# Patient Record
Sex: MALE | Race: WHITE | NOT HISPANIC OR LATINO | Employment: UNEMPLOYED | URBAN - METROPOLITAN AREA
[De-identification: names, ages, dates, MRNs, and addresses within clinical notes are randomized per-mention and may not be internally consistent; named-entity substitution may affect disease eponyms.]

---
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
## 2024-01-16 ENCOUNTER — TELEPHONE (OUTPATIENT)
Dept: PSYCHIATRY | Facility: CLINIC | Age: 19
End: 2024-01-16

## 2024-01-16 NOTE — TELEPHONE ENCOUNTER
Patient contacted the office to schedule a follow up visit with provider. Patient is now scheduled for 1/18  at 8:30 am virtually.

## 2024-01-18 ENCOUNTER — TELEMEDICINE (OUTPATIENT)
Dept: PSYCHIATRY | Facility: CLINIC | Age: 19
End: 2024-01-18
Payer: COMMERCIAL

## 2024-01-18 DIAGNOSIS — F90.0 ADHD (ATTENTION DEFICIT HYPERACTIVITY DISORDER), INATTENTIVE TYPE: ICD-10-CM

## 2024-01-18 DIAGNOSIS — F32.0 CURRENT MILD EPISODE OF MAJOR DEPRESSIVE DISORDER WITHOUT PRIOR EPISODE (HCC): ICD-10-CM

## 2024-01-18 DIAGNOSIS — F41.1 GENERALIZED ANXIETY DISORDER: Primary | ICD-10-CM

## 2024-01-18 PROCEDURE — 99214 OFFICE O/P EST MOD 30 MIN: CPT | Performed by: PSYCHIATRY & NEUROLOGY

## 2024-01-18 PROCEDURE — 90833 PSYTX W PT W E/M 30 MIN: CPT | Performed by: PSYCHIATRY & NEUROLOGY

## 2024-01-18 RX ORDER — METHYLPHENIDATE HYDROCHLORIDE 30 MG/1
30 CAPSULE, EXTENDED RELEASE ORAL EVERY MORNING
Qty: 30 CAPSULE | Refills: 0 | Status: SHIPPED | OUTPATIENT
Start: 2024-02-13

## 2024-01-18 NOTE — PSYCH
Virtual Regular Visit    Verification of patient location:    Patient is located at Home in the following state in which I hold an active license [place]      Assessment/Plan:    Problem List Items Addressed This Visit          Other    Generalized anxiety disorder - Primary    Relevant Medications    methylphenidate (Ritalin LA) 30 MG 24 hr capsule (Start on 2/13/2024)    Current mild episode of major depressive disorder without prior episode (HCC)    Relevant Medications    methylphenidate (Ritalin LA) 30 MG 24 hr capsule (Start on 2/13/2024)    ADHD (attention deficit hyperactivity disorder), inattentive type    Relevant Medications    methylphenidate (Ritalin LA) 30 MG 24 hr capsule (Start on 2/13/2024)       Goals addressed in session: Goal 1 and Goal 2          Reason for visit is   Chief Complaint   Patient presents with    Virtual Regular Visit    ADHD    Anxiety    Follow-up    Medication Management        Encounter provider Devante Burgos MD    Provider located at [address] [place] [id number] [phone]      Recent Visits  Date Type Provider Dept   01/16/24 Telephone Devante Burgos MD [facility]   Showing recent visits within past 7 days and meeting all other requirements  Today's Visits  Date Type Provider Dept   01/18/24 Telemedicine Devante Burgos MD [facility]   Showing today's visits and meeting all other requirements  Future Appointments  No visits were found meeting these conditions.  Showing future appointments within next 150 days and meeting all other requirements       The patient was identified by name and date of birth. Demario Messina was informed that this is a telemedicine visit and that the visit is being conducted throughthe Epic Embedded platform. He agrees to proceed..  My office door was closed. No one else was in the room.  He acknowledged consent and understanding of privacy  and security of the video platform. The patient has agreed to participate and understands they can discontinue the visit at any time.    Patient is aware this is a billable service.        MEDICATION MANAGEMENT NOTE        [facility]      Name and Date of Birth:  Demario Messina 18 y.o. 2005 MRN: [id number]    Date of Visit: January 18, 2024    SUBJECTIVE:    Reason for Visit:   Chief Complaint   Patient presents with    Virtual Regular Visit    ADHD    Anxiety    Follow-up    Medication Management     Chief complaint:    Demario is seen today for a follow-up for panic attack, anxiety symptoms and medication management.    Today, Demario reports that he tolerated the Ritalin well and felt it was helpful with his attention and focus.  However he has not taken it on a daily basis.  He never started college here.  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  He has been accepted and will be starting in the [employer] at [place].  He will be going there in February and starting on end of March 2024.  He terms his overall mood has been happy except being little anxious about the move.  He rates his anxiety symptoms as 5/10 in the worst case scenario and depression as 1/10, 10 being the worst.  He continues to apply for his coping skill and feels that it is manageable.  He has been following up with his therapist at [facility] regularly but will be discharged soon because he will be going to [place].  He feels the Ritalin 30 mg daily was more helpful than the Strattera and would like to continue the same.  He also understand that as he will be moving out of state he will not be able to continue to follow-up through virtual visit and agreeable with discharge at this time.  He denies any symptoms of OCD, or panic attack at this time.  He denies any symptoms of ranjeet, hypomania.  Denies any perceptual disturbances.  No delusions  "elicited at this time.  He reports sleeping adequate hours.  Denies any suicidal/homicidal ideation intent or plan.  He did not have any other complaint of concern.      HPI ROS Appetite Changes and Sleep:     He reports adequate number of sleep hours, adequate appetite, adequate energy level    Review Of Systems:    Constitutional as noted in HPI   ENT negative   Cardiovascular negative   Respiratory negative   Gastrointestinal negative   Genitourinary negative   Musculoskeletal negative   Integumentary negative   Neurological negative   Endocrine negative   Other Symptoms none, all other systems are negative     The italicized information immediately following this statement has been pulled forward from previous documentation written by this provider, during initial office visit on 02/20/20 and any pertinent changes have been updated accordingly:     Anxiety- patient reports he has been anxious \"all my life\" though in the past year he feels it has become more prominent.  He is always worried about everything around him, \"even things I should not be worried about\".  He reports he has significant social social anxiety when it comes to \"group of new people\".  He just completely shuts down until they start conversing with him.  He also reports always worried about something bad might happen.  His always worried about his grades and worries about the future and feels sometimes it is more than usual.  He always compared himself is with other peers and wonders if he is as good as the other kids.  He worries about things which has already happened.  He also worries about his performances and and if he is doing it right even before the task is complete.  He reports that he always had it and since middle school it started to become more.  He is aware that sometimes it is excessive but he cannot help himself.  Today he rates his anxiety as 7/10 in severity, 10 being severely anxious.     Panic attack- panic patient reports " "getting panic attack since 6 grade.  Terms his panic attack as \"heart beating faster, stomach cramps, difficulty with breathing, fever nervous, shaky and fear of something bad might happen\".  He reports getting them almost on daily basis mostly in the school periods though it sometimes get happened outside of school too.  It usually lasts from 5-20 minutes.  He reports the common triggers her \"meeting new person, surprise, not being in control, sudden changes in plan or situation\".  He reports sometimes getting them without any specific triggers.  He reports most of the panic attacks have happened in the school.  He usually tries to distract himself and takes deep breathing.  He also reports having anxiety about anticipatory attack.  He feels his panic attack has worsened in the past few months and increased in frequency.     Attention deficit-patient reports he has been struggling with paying attention to his class since starting 10th grade and in the past 2-3 months it has worsened.  He reports he reports his grades have fallen as he is not able to pay attention and is spacing out.  He reports on several classes he will suddenly realize that the class was over or he has gone to an next class as though he \"zoned out\".  He reports he did struggle in the past with some attention deficit but pushed himself through it never was diagnosed with attention deficit.  He reports struggling most with the math and scores is 30's.  He reports poor grade in for subjects.  His grade ranges from 30s to 80s.  Patient also reports difficulty with following direction or finish activities.  He often makes careless mistakes and give poor attention to details which has impacted his homework. He gets very easily distracted.  He is forgetful about his daily activities and will often loses things.  As per mother, patient was never evaluated for ADHD as school has never had any concern but she is not sure if it was missed.  She reports " "having her concern about it in the past even in last year but as patient \"pulled through\" she did not seek help.  However in the last few months patient's grades have declined significantly and therefore she wanted him to be evaluated.     Depression- patient reports feeling sad at times.  He reports losing interest in other activities except Karate.  In the past year he recalls being sad 100/365 days.  However he denies having sad mood or loss of interest for consecutive 2 weeks.  He also reports fluctuating sleeping pattern and sometimes waking up not feeling fresh.  He sleeps between 5-6 hours during weekdays and almost 12 hours during the weekends.  He rates his depression as 5/10 in severity today.  Complaints of lower energy level in the past few months. He denies having any passive death wishes.  Denies any active suicidal/homicidal ideation intent or plan at this time.  He denies symptoms suggestive of ranjeet, hypomania and psychosis at this time.     Mother corroborates with the above-mentioned history.  She reported that she was concerned about patient's poor school performance and was wondering if patient is struggling with attention deficit or anxiety symptoms.  Both patient and mother is amenable at this time with starting medication.  Mother did not have any other safety concerns at this time.     Past Psychiatric History:      Past Inpatient Psychiatric Treatment:   No history of past inpatient psychiatric admissions  Past Outpatient Psychiatric Treatment:    No history of past outpatient psychiatric treatment  Past Suicide Attempts: no  Past Violent Behavior: no  Past Psychiatric Medication Trials: none  Current medications:   for IBS-Elavil 10 mg 3 times daily at bedtime, hyoscyamine, Pepcid 20 mg daily.     Traumatic History:      Abuse: no history of physical or sexual abuse  Other Traumatic Events: none      Family Psychiatric History:   No family history of psychiatric illness, suicide attempt, " "substance abuse.     Substance Use History:  Denies any illicit substance use.  Tatyana any over-the-counter drug use.     Past Medical History:  Lactose intolerance.  Irritable bowel syndrome with diarrhea- currently on Elavil 10 mg 3 tabs at bedtime and follows up with GI.  History of head injury, seizure-none     Allergies:  No Known Allergies     Birth and Developmental History:  Birth wt.- 6.13 lbs, FT  at 40th week.    Spoke first word: at 6th month  Walked: at 9th month  Toilet trained:3 yr  Early intervention: none     Social History:    Patient lives with parents in [place].  Mother is A NICU RN at [employer], father is a .  He attended [employer] from 1st-5th grade, [employer] for 6th,7th and 8th grade. Patient is currently enrolled in 10th grade at [employer], in standard education.  Has falling grades in current academic year.  As per patient he was able to mental did good grades until last year.   He is heterosexual.  Has been in relationship in the past.  Has few close friends.    Access to guns- denies.    History Review: The following portions of the patient's history were reviewed and updated as appropriate: allergies, current medications, past family history, past medical history, past social history, past surgical history and problem list.       OBJECTIVE:     Vital signs in last 24 hours:    There were no vitals filed for this visit.    Video exam-    Mental Status Evaluation:  Appearance sitting comfortably at home, dressed in casual clothing, good eye contact, long hair   Mood \" good\"   Affect Appears generally euthymic, stable, mood-congruent   Speech Normal rate, rhythm, and volume   Thought Processes Linear and goal directed   Associations intact associations   Perceptual disturbances Denies any auditory or visual hallucinations   Abnormal Thoughts  Risk Potential Suicidal ideation - None  Homicidal ideation - " None  Potential for aggression - No   Thought Content No passive or active suicidal or homicidal ideation, intent, or plan.   Orientation Oriented to person, place, time, and situation   Recent and Remote Memory Grossly intact   Attention Span and Concentration Concentration intact   Intellect Appears to be of Average Intelligence   Insight Insight intact   Judgement judgment was intact   Language Within normal limits   Fund of Knowledge Age appropriate   Pain None     Laboratory Results:   Recent Labs (last 6 months):   No visits with results within 6 Month(s) from this visit.   Latest known visit with results is:   Appointment on 04/26/2023   Component Date Value    WBC 04/26/2023 5.51     RBC 04/26/2023 5.36     Hemoglobin 04/26/2023 15.9     Hematocrit 04/26/2023 46.1     MCV 04/26/2023 86     MCH 04/26/2023 29.7     MCHC 04/26/2023 34.5     RDW 04/26/2023 12.3     MPV 04/26/2023 9.8     Platelets 04/26/2023 277     nRBC 04/26/2023 0     Neutrophils Relative 04/26/2023 51     Immat GRANS % 04/26/2023 0     Lymphocytes Relative 04/26/2023 37     Monocytes Relative 04/26/2023 9     Eosinophils Relative 04/26/2023 2     Basophils Relative 04/26/2023 1     Neutrophils Absolute 04/26/2023 2.82     Immature Grans Absolute 04/26/2023 0.00     Lymphocytes Absolute 04/26/2023 2.03     Monocytes Absolute 04/26/2023 0.48     Eosinophils Absolute 04/26/2023 0.12     Basophils Absolute 04/26/2023 0.06     Sodium 04/26/2023 135     Potassium 04/26/2023 4.4     Chloride 04/26/2023 108     CO2 04/26/2023 22     ANION GAP 04/26/2023 5     BUN 04/26/2023 17     Creatinine 04/26/2023 0.93     Glucose, Fasting 04/26/2023 70     Calcium 04/26/2023 9.2     AST 04/26/2023 17     ALT 04/26/2023 17     Alkaline Phosphatase 04/26/2023 66     Total Protein 04/26/2023 7.6     Albumin 04/26/2023 4.5     Total Bilirubin 04/26/2023 1.37 (H)     eGFR 04/26/2023 118     Cholesterol 04/26/2023 149     Triglycerides 04/26/2023 57     HDL, Direct  04/26/2023 49     LDL Calculated 04/26/2023 89     Non-HDL-Chol (CHOL-HDL) 04/26/2023 100     TSH 3RD GENERATON 04/26/2023 1.330     Hepatitis C Ab 04/26/2023 Non-reactive     HIV-1 p24 Antigen 04/26/2023 Non-Reactive     HIV-1 Antibody 04/26/2023 Non-Reactive     HIV-2 Antibody 04/26/2023 Non-Reactive     HIV Ag-Ab 5th Gen 04/26/2023 Non-Reactive      I have personally reviewed all pertinent laboratory/tests results.      Assessment/Plan:       Diagnoses and all orders for this visit:    Generalized anxiety disorder  -     methylphenidate (Ritalin LA) 30 MG 24 hr capsule; Take 1 capsule (30 mg total) by mouth every morning Max Daily Amount: 30 mg Do not start before February 13, 2024.    Current mild episode of major depressive disorder without prior episode (HCC)  -     methylphenidate (Ritalin LA) 30 MG 24 hr capsule; Take 1 capsule (30 mg total) by mouth every morning Max Daily Amount: 30 mg Do not start before February 13, 2024.    ADHD (attention deficit hyperactivity disorder), inattentive type  -     methylphenidate (Ritalin LA) 30 MG 24 hr capsule; Take 1 capsule (30 mg total) by mouth every morning Max Daily Amount: 30 mg Do not start before February 13, 2024.          Assessment:  Demario is a 18 y.o. male, domiciled with parents in [place], started college at [employer], graduated from [employer] (standard type of education, 30's -80's grades, 6 close friends, no h/o bullying or teasing), no prior established psychiatric history, no prior psychiatric hospitalization , no prior suicidal attempts , no prior history of self-injurious behavior, no prior history of physical aggression, no substance abuse history, PMH significant for lactose intolerance and irritable bowel syndrome currently follows up with GI,  presents to [facility] outpatient clinic for psychiatric evaluation to address ongoing symptoms of anxiety, depression poor concentration in school.  On assessment today, Demario is  progressing.  He has come for follow-up after 5 months.  He has tolerated the Ritalin well and felt it was helpful with his attention and focus.  He did not take it on regular basis because he did not start college here at [place].  He will be starting on March 25 at the [employer] in [place] which is a [employer] to pursue [employer].  Terms his overall mood has been euthymic.  Anxiety symptoms are situational but mostly manageable with coping skills which he learned from his therapist.  He continues to follow-up with his therapist at [facility].  Rates his anxiety and depression symptoms minimal today..  He denies any suicidal/homicidal ideation intent or plan.  Patient understands that as he will be moving to [place] he will not be able to continue care and he is comfortable to be discharged at this point of time.  From GI he was prescribed hyoscyamine for his irritable bowel syndrome and continues to take the same apart from Ritalin LA 30 mg daily.  He denies any symptoms of ranjeet, hypomania or psychosis.  At this time recommended to continue with Ritalin LA 30 mg daily and find a new provider.  He verbalized understanding.  He is psychiatrically stable at this time to continue outpatient care.  He will be discharged from the clinic at this time.   .      DSM Diagnoses:  Panic attack  Unspecified depressive disorder  ADHD inattentive type  R/o learning difficulty with math      IBS  Allergies:: NKDA                        Recommendation/plan:  1.Currently, patient is not an imminent risk of harm to self or others and is appropriate for outpatient level of care at this time  2. Medications:   A) for anxiety and panic attack-no meds at this time as patient has discontinued   B) for attention deficit-discontinue Strattera 40 mg daily.  Start Ritalin LA 30 mg daily.  3. Patient and family were educated to seek emergency care if patient decompensates in any way including becoming suicidal. Patient and  family verbalized understanding.  4. Continue therapy at [facility]  5. Medical- F/u with primary care provider for on-going medical care.  Patient has been taking hyoscyamine for IBS and following up with GI accordingly.  6.  No follow-up appointment given as patient will be discharged.      Treatment Recommendations:      Risks, Benefits And Possible Side Effects Of Medications:  Risks, benefits, and possible side effects of medications explained to patient and family, they verbalize understanding    Controlled Medication Discussion: No records found for controlled prescriptions according to [place] Prescription Drug Monitoring Program.      Treatment Plan: [place] [facility]    This note has been constructed using a voice recognition system.    There may be translation, syntax,  or grammatical errors. If you have any questions, please contact the dictating provider.        Visit Time    Visit Start Time: 8:30 AM  Visit Stop Time: 9:00 AM  Total Visit Duration: 30 minutes

## 2024-01-24 ENCOUNTER — TELEMEDICINE (OUTPATIENT)
Dept: BEHAVIORAL/MENTAL HEALTH CLINIC | Facility: CLINIC | Age: 19
End: 2024-01-24
Payer: COMMERCIAL

## 2024-01-24 DIAGNOSIS — F90.0 ADHD (ATTENTION DEFICIT HYPERACTIVITY DISORDER), INATTENTIVE TYPE: ICD-10-CM

## 2024-01-24 DIAGNOSIS — F32.0 CURRENT MILD EPISODE OF MAJOR DEPRESSIVE DISORDER WITHOUT PRIOR EPISODE (HCC): ICD-10-CM

## 2024-01-24 DIAGNOSIS — F41.1 GENERALIZED ANXIETY DISORDER: Primary | ICD-10-CM

## 2024-01-24 PROCEDURE — 90832 PSYTX W PT 30 MINUTES: CPT | Performed by: PSYCHIATRY & NEUROLOGY

## 2024-01-24 NOTE — PSYCH
Virtual Regular Visit    Verification of patient location:    Patient is located at Home in the following state in which I hold an active license [place]      Assessment/Plan:    Problem List Items Addressed This Visit          Other    Generalized anxiety disorder - Primary    Current mild episode of major depressive disorder without prior episode (HCC)    ADHD (attention deficit hyperactivity disorder), inattentive type        Reason for visit is   Chief Complaint   Patient presents with    Virtual Regular Visit       Encounter provider REED LÓPEZ    Provider located at [facility]  [facility]  [address] [id number] [phone]      Recent Visits  No visits were found meeting these conditions.  Showing recent visits within past 7 days and meeting all other requirements  Today's Visits  Date Type Provider Dept   01/24/24 Telemedicine REED López  [facility]   Showing today's visits and meeting all other requirements  Future Appointments  No visits were found meeting these conditions.  Showing future appointments within next 150 days and meeting all other requirements       The patient was identified by name and date of birth. Demario Messina was informed that this is a telemedicine visit and that the visit is being conducted throughthe Epic Embedded platform. He agrees to proceed..  My office door was closed. No one else was in the room.  He acknowledged consent and understanding of privacy and security of the video platform. The patient has agreed to participate and understands they can discontinue the visit at any time.    Patient is aware this is a billable service.   HPI     Past Medical History:   Diagnosis Date    Anxiety     Depression     Intermittent diarrhea     Lactose intolerance     Panic attack        Past Surgical History:   Procedure Laterality Date    APPENDECTOMY       CIRCUMCISION         Current Outpatient Medications   Medication Sig Dispense Refill    amitriptyline (ELAVIL) 10 mg tablet Take 3 tablets (30 mg total) by mouth daily after dinner (Patient not taking: Reported on 4/17/2023) 270 tablet 1    famotidine (PEPCID) 20 mg tablet Take 1 tablet (20 mg total) by mouth 2 (two) times a day as needed for heartburn 180 tablet 1    gentamicin (GARAMYCIN) 0.3 % ophthalmic solution Administer 2 drops to the right eye 4 (four) times a day 5 mL 0    hyoscyamine (LEVSIN/SL) 0.125 mg SL tablet Take 2 tablets (0.25 mg total) by mouth every 6 (six) hours as needed for cramping 90 tablet 0    Lactase 4500 units CHEW Chew every 8 (eight) hours      methylphenidate (RITALIN LA) 30 MG 24 hr capsule Take 1 capsule (30 mg total) by mouth every morning Max Daily Amount: 30 mg 30 capsule 0    methylphenidate (RITALIN LA) 30 MG 24 hr capsule Take 1 capsule (30 mg total) by mouth every morning Max Daily Amount: 30 mg Do not start before September 1, 2023. 30 capsule 0    [START ON 2/13/2024] methylphenidate (Ritalin LA) 30 MG 24 hr capsule Take 1 capsule (30 mg total) by mouth every morning Max Daily Amount: 30 mg Do not start before February 13, 2024. 30 capsule 0     No current facility-administered medications for this visit.        No Known Allergies    Review of Systems    Video Exam    There were no vitals filed for this visit.    Physical Exam   Behavioral Health Psychotherapy Progress Note    Psychotherapy Provided: Individual Psychotherapy     1. Generalized anxiety disorder        2. Current mild episode of major depressive disorder without prior episode ([facility])        3. ADHD (attention deficit hyperactivity disorder), inattentive type            Goals addressed in session: Goal 1     DATA: Met with Demario for follow up. Demario shared that he is excited but anxious to leave for film school in [place] at the end of next month, and talked about some of the things he is worried about (taking  "care of himself on his own for the first time, staying organized, not forgetting things).   He also expressed worry about not getting his medications refilled regularly and what will happen to his ability to concentrate at school if he runs out of medication.  Used solution-focused therapy to help Demario identify ways that he could solve his prescription issue, and encouraged him to look into various options to help this process along.  Demario noted that he has not been taking his meds regularly to \"save\" them for when he is in school, as he is concerned about not getting them.  Demario also said that he thinks his ADHD has been worse recently, as he will have difficulty focusing on a conversation and understanding what people are saying to him, and immediately forgetting conversations.  Discussed nature of ADHD, ways that he might try to offset those symptoms and cope with the distress they cause.  Also discussed discharge from therapy after next appointment at end of February, due to his being out of state for a year, at least.  Demario said that he understood the reason for discharge, and this writer emphasized that he can return to therapy if he wants to once he returns to [place].    During this session, this clinician used the following therapeutic modalities: Client-centered Therapy, Cognitive Behavioral Therapy, Solution-Focused Therapy, and Supportive Psychotherapy    Substance Abuse was not addressed during this session. If the client is diagnosed with a co-occurring substance use disorder, please indicate any changes in the frequency or amount of use: n/a. Stage of change for addressing substance use diagnoses: No substance use/Not applicable    ASSESSMENT:  Demario Messina presents with a Euthymic/ normal and Anxious mood.     his affect is Normal range and intensity, which is congruent, with his mood and the content of the session. The client has made progress on their goals.     Demario Messina presents with a low risk " "of suicide, low risk of self-harm, and minimal risk of harm to others.    For any risk assessment that surpasses a \"low\" rating, a safety plan must be developed.    A safety plan was indicated: no  If yes, describe in detail n/a    PLAN: Between sessions, Demario Messina will investigate ways to have prescriptions filled in [place] while at school, and will use anxiety reduction strategies as needed while preparing to leave for school. At the next session, the therapist will use Client-centered Therapy, Cognitive Behavioral Therapy, Solution-Focused Therapy, and Supportive Psychotherapy to address anxiety, ADHD.    Behavioral Health Treatment Plan and Discharge Planning: Demario Messina is aware of and agrees to continue to work on their treatment plan. They have identified and are working toward their discharge goals. yes    Visit start and stop times:    01/24/24  Start Time: 1605  Stop Time: 1639  Total Visit Time: 34 minutes    "

## 2024-01-26 ENCOUNTER — OFFICE VISIT (OUTPATIENT)
Dept: FAMILY MEDICINE CLINIC | Facility: CLINIC | Age: 19
End: 2024-01-26
Payer: COMMERCIAL

## 2024-01-26 VITALS
TEMPERATURE: 98.2 F | HEIGHT: 70 IN | OXYGEN SATURATION: 98 % | HEART RATE: 90 BPM | WEIGHT: 144.2 LBS | SYSTOLIC BLOOD PRESSURE: 116 MMHG | DIASTOLIC BLOOD PRESSURE: 74 MMHG | BODY MASS INDEX: 20.64 KG/M2

## 2024-01-26 DIAGNOSIS — F32.0 CURRENT MILD EPISODE OF MAJOR DEPRESSIVE DISORDER WITHOUT PRIOR EPISODE (HCC): ICD-10-CM

## 2024-01-26 DIAGNOSIS — Z00.00 ANNUAL PHYSICAL EXAM: Primary | ICD-10-CM

## 2024-01-26 DIAGNOSIS — F90.0 ADHD (ATTENTION DEFICIT HYPERACTIVITY DISORDER), INATTENTIVE TYPE: ICD-10-CM

## 2024-01-26 PROCEDURE — 99395 PREV VISIT EST AGE 18-39: CPT | Performed by: NURSE PRACTITIONER

## 2024-01-26 NOTE — PROGRESS NOTES
ADULT ANNUAL PHYSICAL  [facility]    NAME: Demario Messina  AGE: 18 y.o. SEX: male  : 2005     DATE: 2024     Assessment and Plan:   Comprehensive physical exam   permit form completed  PMH and chronic conditions reviewed  Medications reconciled  Preventative care and recommended screenings as below   Routine labs completed last year   Repeat Bilirubin still pending  Continue annual physicals  Follow-up sooner as needed  Problem List Items Addressed This Visit          Other    Current mild episode of major depressive disorder without prior episode (HCC)     Reports well controlled at this time.   Managed by psychiatry  Follows with therapist     Denies depression  No SI/HI  Denies ETOH or drug use         ADHD (attention deficit hyperactivity disorder), inattentive type     Reports doing well.   Managed by psychiatry: Dr. Burgos            Other Visit Diagnoses       Annual physical exam    -  Primary              Immunizations and preventive care screenings were discussed with patient today. Appropriate education was printed on patient's after visit summary.    Counseling:  Alcohol/drug use: discussed moderation in alcohol intake, the recommendations for healthy alcohol use, and avoidance of illicit drug use.  Dental Health: discussed importance of regular tooth brushing, flossing, and dental visits.  Injury prevention: discussed safety/seat belts, safety helmets, smoke detectors, carbon dioxide detectors, and smoking near bedding or upholstery.  Sexual health: discussed sexually transmitted diseases, partner selection, use of condoms, avoidance of unintended pregnancy, and contraceptive alternatives.  Exercise: the importance of regular exercise/physical activity was discussed. Recommend exercise 3-5 times per week for at least 30 minutes.       Depression Screening and Follow-up Plan: Patient was screened for depression during today's  encounter. They screened negative with a PHQ-9 score of 0.        Return in about 1 year (around 2025) for Annual physical.     Chief Complaint:     Chief Complaint   Patient presents with    Physical Exam      History of Present Illness:     Adult Annual Physical   Patient here for a comprehensive physical exam. The patient reports no problems.    Diet and Physical Activity  Diet/Nutrition: well balanced diet.   Exercise: walking and moderate cardiovascular exercise.      Depression Screening  PHQ-2/9 Depression Screening    Little interest or pleasure in doing things: 0 - not at all  Feeling down, depressed, or hopeless: 0 - not at all  Trouble falling or staying asleep, or sleeping too much: 0 - not at all  Feeling tired or having little energy: 0 - not at all  Poor appetite or overeatin - not at all  Feeling bad about yourself - or that you are a failure or have let yourself or your family down: 0 - not at all  Trouble concentrating on things, such as reading the newspaper or watching television: 0 - not at all  Moving or speaking so slowly that other people could have noticed. Or the opposite - being so fidgety or restless that you have been moving around a lot more than usual: 0 - not at all  Thoughts that you would be better off dead, or of hurting yourself in some way: 0 - not at all  PHQ-9 Score: 0  PHQ-9 Interpretation: No or Minimal depression       General Health  Sleep: sleeps well and varied schedule .   Hearing: normal - bilateral.  Vision: most recent eye exam >1 year ago and Snellen today: 20/25 OU. Recommend optometric exam - acuity and general eye health .   Dental: regular dental visits.     Immunizations: age appropriate UTD     Health  History of STDs?: no.  Discuss importance of Condom use - STD prevention    Advanced Care Planning  Do you have an advanced directive? no  Do you have a durable medical power of ? no     Review of Systems:     Review of Systems    Constitutional: Negative.    HENT: Negative.     Eyes: Negative.    Respiratory: Negative.     Cardiovascular: Negative.    Gastrointestinal: Negative.    Genitourinary: Negative.    Musculoskeletal: Negative.    Skin: Negative.    Neurological: Negative.    Psychiatric/Behavioral: Negative.        Past Medical History:     Past Medical History:   Diagnosis Date    Anxiety     Depression     Intermittent diarrhea     Lactose intolerance     Panic attack       Past Surgical History:     Past Surgical History:   Procedure Laterality Date    APPENDECTOMY      CIRCUMCISION        Social History:     Social History     Socioeconomic History    Marital status: Single     Spouse name: None    Number of children: None    Years of education: None    Highest education level: None   Occupational History    None   Tobacco Use    Smoking status: Never    Smokeless tobacco: Never   Vaping Use    Vaping status: Never Used   Substance and Sexual Activity    Alcohol use: No    Drug use: No    Sexual activity: Never   Other Topics Concern    None   Social History Narrative    Always uses seatbelt    Feels safe at home     Social Determinants of Health     Financial Resource Strain: Not on file   Food Insecurity: No Food Insecurity (2/20/2020)    Hunger Vital Sign     Worried About Running Out of Food in the Last Year: Never true     Ran Out of Food in the Last Year: Never true   Transportation Needs: No Transportation Needs (2/20/2020)    PRAPARE - Transportation     Lack of Transportation (Medical): No     Lack of Transportation (Non-Medical): No   Physical Activity: Insufficiently Active (2/20/2020)    Exercise Vital Sign     Days of Exercise per Week: 2 days     Minutes of Exercise per Session: 30 min   Stress: Stress Concern Present (2/20/2020)    [employer] [place] [employer] - Occupational Stress Questionnaire     Feeling of Stress : To some extent   Social Connections: Unknown (2/20/2020)    Social Connection and  Isolation Panel [NHANES]     Frequency of Communication with Friends and Family: Never     Frequency of Social Gatherings with Friends and Family: Never     Attends Mandaen Services: Never     Active Member of Clubs or Organizations: No     Attends Club or Organization Meetings: Never     Marital Status: Not on file   Intimate Partner Violence: Not At Risk (2/20/2020)    Humiliation, Afraid, Rape, and Kick questionnaire     Fear of Current or Ex-Partner: No     Emotionally Abused: No     Physically Abused: No     Sexually Abused: No   Housing Stability: Not on file      Family History:     Family History   Problem Relation Age of Onset    Stroke Family     No Known Problems Mother     No Known Problems Father     Hypertension Maternal Grandfather     Prostate cancer Maternal Grandfather     Colon cancer Maternal Grandfather     Diabetes Paternal Grandmother       Current Medications:     Current Outpatient Medications   Medication Sig Dispense Refill    famotidine (PEPCID) 20 mg tablet Take 1 tablet (20 mg total) by mouth 2 (two) times a day as needed for heartburn 180 tablet 1    hyoscyamine (LEVSIN/SL) 0.125 mg SL tablet Take 2 tablets (0.25 mg total) by mouth every 6 (six) hours as needed for cramping 90 tablet 0    Lactase 4500 units CHEW Chew every 8 (eight) hours      methylphenidate (RITALIN LA) 30 MG 24 hr capsule Take 1 capsule (30 mg total) by mouth every morning Max Daily Amount: 30 mg 30 capsule 0    methylphenidate (RITALIN LA) 30 MG 24 hr capsule Take 1 capsule (30 mg total) by mouth every morning Max Daily Amount: 30 mg Do not start before September 1, 2023. (Patient not taking: Reported on 1/26/2024) 30 capsule 0    [START ON 2/13/2024] methylphenidate (Ritalin LA) 30 MG 24 hr capsule Take 1 capsule (30 mg total) by mouth every morning Max Daily Amount: 30 mg Do not start before February 13, 2024. (Patient not taking: Reported on 1/26/2024 Do not start before February 13, 2024.) 30 capsule 0     No  "current facility-administered medications for this visit.      Allergies:     No Known Allergies   Physical Exam:     /74 (BP Location: Left arm, Patient Position: Sitting, Cuff Size: Standard)   Pulse 90   Temp 98.2 °F (36.8 °C) (Temporal)   Ht 5' 10\" (1.778 m)   Wt 65.4 kg (144 lb 3.2 oz)   SpO2 98%   BMI 20.69 kg/m²     Physical Exam  Vitals and nursing note reviewed.   Constitutional:       General: He is not in acute distress.     Appearance: Normal appearance. He is well-developed and well-groomed. He is not ill-appearing.   HENT:      Head: Normocephalic.      Right Ear: Tympanic membrane, ear canal and external ear normal.      Left Ear: Tympanic membrane, ear canal and external ear normal.      Nose: Nose normal.      Mouth/Throat:      Mouth: Mucous membranes are moist.      Pharynx: Oropharynx is clear.   Eyes:      Conjunctiva/sclera: Conjunctivae normal.      Pupils: Pupils are equal, round, and reactive to light.   Neck:      Thyroid: No thyromegaly.      Vascular: No carotid bruit.   Cardiovascular:      Rate and Rhythm: Normal rate and regular rhythm.      Pulses:           Posterior tibial pulses are 2+ on the right side and 2+ on the left side.      Heart sounds: Normal heart sounds.   Pulmonary:      Effort: Pulmonary effort is normal. No respiratory distress.      Breath sounds: Normal breath sounds and air entry.   Abdominal:      General: Bowel sounds are normal.      Palpations: Abdomen is soft.      Tenderness: There is no abdominal tenderness.   Musculoskeletal:      Right lower leg: No edema.      Left lower leg: No edema.   Lymphadenopathy:      Cervical: No cervical adenopathy.   Skin:     General: Skin is warm and dry.   Neurological:      General: No focal deficit present.      Mental Status: He is alert and oriented to person, place, and time.   Psychiatric:         Attention and Perception: Attention normal.         Mood and Affect: Mood normal.         Behavior: Behavior " normal.         Thought Content: Thought content normal.         Judgment: Judgment normal.          SASHA Lugo   [facility]

## 2024-02-06 NOTE — ASSESSMENT & PLAN NOTE
Reports well controlled at this time.   Managed by psychiatry  Follows with therapist     Denies depression  No SI/HI  Denies ETOH or drug use

## 2024-02-07 ENCOUNTER — TELEPHONE (OUTPATIENT)
Age: 19
End: 2024-02-07

## 2024-02-07 NOTE — TELEPHONE ENCOUNTER
Mother Deja requesting Blood Type be added to his lab orders.  Mother aware insurance may not cover it.  Mother informed if Demario donates blood the will automatic give him his blood type.    Deja stated she is fine and will pay for them to run Blood Type. They just need an order  Please call Deja with an update.     Please review and advice  Thank you

## 2024-02-08 ENCOUNTER — OFFICE VISIT (OUTPATIENT)
Dept: GASTROENTEROLOGY | Facility: CLINIC | Age: 19
End: 2024-02-08
Payer: COMMERCIAL

## 2024-02-08 VITALS
HEIGHT: 70 IN | SYSTOLIC BLOOD PRESSURE: 133 MMHG | BODY MASS INDEX: 20.41 KG/M2 | WEIGHT: 142.6 LBS | OXYGEN SATURATION: 99 % | TEMPERATURE: 98.7 F | HEART RATE: 71 BPM | DIASTOLIC BLOOD PRESSURE: 67 MMHG

## 2024-02-08 DIAGNOSIS — E73.9 LACTOSE INTOLERANCE: ICD-10-CM

## 2024-02-08 DIAGNOSIS — K21.9 GASTROESOPHAGEAL REFLUX DISEASE, UNSPECIFIED WHETHER ESOPHAGITIS PRESENT: Primary | ICD-10-CM

## 2024-02-08 DIAGNOSIS — K58.0 IRRITABLE BOWEL SYNDROME WITH DIARRHEA: ICD-10-CM

## 2024-02-08 DIAGNOSIS — R19.5 LOOSE STOOLS: ICD-10-CM

## 2024-02-08 PROCEDURE — 99203 OFFICE O/P NEW LOW 30 MIN: CPT | Performed by: INTERNAL MEDICINE

## 2024-02-08 RX ORDER — OMEPRAZOLE 20 MG/1
20 CAPSULE, DELAYED RELEASE ORAL DAILY
Qty: 90 CAPSULE | Refills: 3 | Status: SHIPPED | OUTPATIENT
Start: 2024-02-08

## 2024-02-08 NOTE — PROGRESS NOTES
[facility] - Outpatient Consultation  Demario Messina 18 y.o. male MRN: [id number]  Encounter: [id number]    HPI:    Demario Messina is a 18 y.o. male with depression and anxiety who presents for IBS-D, abdominal cramping, lactose intolerance.  He was previously a patient in pediatric GI.  He was being managed with famotidine, hyoscyamine, amitriptyline, and lactase pills.  In the office with his mother.    Current issues are incomplete BMs, cramping, and GERD.      He has 2-6 BMs per day, ranging from #2 to #4 on stool chart.  Occasionally, stools are #5 and #6 and he gets cramping for which he takes hyoscyamine.  He no longer takes amitriptyline.  Other times, he has hard stools with small amount of blood.  He continues to avoid milk and and isce cream, which cause cramping and diarrhea.  For GERD, he takes famotidine daily.   Weight stable.      Labs from April 2023 show normal CBC and CMP except for milt Tb elevation to 1.37.  Normal lipid panel, and TSH.    No tobacco or alcohol.  Starting college in March in [place], will be studying film.        REVIEW OF SYSTEMS:  CONSTITUTIONAL: Denies any fever, chills, rigors, and weight loss.  HEENT: No earache or tinnitus. Denies hearing loss or visual disturbances.  CARDIOVASCULAR: No chest pain or palpitations.   RESPIRATORY: Denies any cough, hemoptysis, shortness of breath or dyspnea on exertion.  GASTROINTESTINAL: As noted in the History of Present Illness.   GENITOURINARY: No problems with urination. Denies any hematuria or dysuria.  NEUROLOGIC: No dizziness or vertigo, denies headaches.   MUSCULOSKELETAL: Denies any muscle or joint pain.   SKIN: Denies skin rashes or itching.   ENDOCRINE: Denies excessive thirst. Denies intolerance to heat or cold.  PSYCHOSOCIAL: Denies depression or anxiety. Denies any recent memory loss.     Historical Information   Past Medical History:   Diagnosis Date    Anxiety     Depression     Intermittent diarrhea      "Lactose intolerance     Panic attack      Past Surgical History:   Procedure Laterality Date    APPENDECTOMY      CIRCUMCISION       Social History   Social History     Substance and Sexual Activity   Alcohol Use No     Social History     Substance and Sexual Activity   Drug Use No     Social History     Tobacco Use   Smoking Status Never   Smokeless Tobacco Never     Family History   Problem Relation Age of Onset    Stroke Family     No Known Problems Mother     No Known Problems Father     Hypertension Maternal Grandfather     Prostate cancer Maternal Grandfather     Colon cancer Maternal Grandfather     Diabetes Paternal Grandmother        Meds/Allergies       Current Outpatient Medications:     famotidine (PEPCID) 20 mg tablet    hyoscyamine (LEVSIN/SL) 0.125 mg SL tablet    Lactase 4500 units CHEW    methylphenidate (RITALIN LA) 30 MG 24 hr capsule    omeprazole (PriLOSEC) 20 mg delayed release capsule    methylphenidate (RITALIN LA) 30 MG 24 hr capsule    [START ON 2/13/2024] methylphenidate (Ritalin LA) 30 MG 24 hr capsule    No Known Allergies    Objective   Blood pressure 133/67, pulse 71, temperature 98.7 °F (37.1 °C), temperature source Tympanic, height 5' 10\" (1.778 m), weight 64.7 kg (142 lb 9.6 oz), SpO2 99%. Body mass index is 20.46 kg/m².  PHYSICAL EXAM:    General Appearance:   Alert, cooperative, no distress   HEENT:   Normocephalic, atraumatic, anicteric.     Neck:  Supple, symmetrical, trachea midline   Lungs:   Clear to auscultation bilaterally; no rales, rhonchi or wheezing; respirations unlabored    Heart::   Regular rate and rhythm; no murmur, rub, or gallop.   Abdomen:   Soft, non-tender, non-distended; normal bowel sounds; no masses, no organomegaly    Genitalia:   Deferred    Rectal:   Deferred    Extremities:  No cyanosis, clubbing or edema    Pulses:  2+ and symmetric    Skin:  No jaundice, rashes, or lesions    Lymph nodes:  No palpable cervical lymphadenopathy        Lab Results:   No " visits with results within 1 Day(s) from this visit.   Latest known visit with results is:   Appointment on 04/26/2023   Component Date Value    WBC 04/26/2023 5.51     RBC 04/26/2023 5.36     Hemoglobin 04/26/2023 15.9     Hematocrit 04/26/2023 46.1     MCV 04/26/2023 86     MCH 04/26/2023 29.7     MCHC 04/26/2023 34.5     RDW 04/26/2023 12.3     MPV 04/26/2023 9.8     Platelets 04/26/2023 277     nRBC 04/26/2023 0     Neutrophils Relative 04/26/2023 51     Immat GRANS % 04/26/2023 0     Lymphocytes Relative 04/26/2023 37     Monocytes Relative 04/26/2023 9     Eosinophils Relative 04/26/2023 2     Basophils Relative 04/26/2023 1     Neutrophils Absolute 04/26/2023 2.82     Immature Grans Absolute 04/26/2023 0.00     Lymphocytes Absolute 04/26/2023 2.03     Monocytes Absolute 04/26/2023 0.48     Eosinophils Absolute 04/26/2023 0.12     Basophils Absolute 04/26/2023 0.06     Sodium 04/26/2023 135     Potassium 04/26/2023 4.4     Chloride 04/26/2023 108     CO2 04/26/2023 22     ANION GAP 04/26/2023 5     BUN 04/26/2023 17     Creatinine 04/26/2023 0.93     Glucose, Fasting 04/26/2023 70     Calcium 04/26/2023 9.2     AST 04/26/2023 17     ALT 04/26/2023 17     Alkaline Phosphatase 04/26/2023 66     Total Protein 04/26/2023 7.6     Albumin 04/26/2023 4.5     Total Bilirubin 04/26/2023 1.37 (H)     eGFR 04/26/2023 118     Cholesterol 04/26/2023 149     Triglycerides 04/26/2023 57     HDL, Direct 04/26/2023 49     LDL Calculated 04/26/2023 89     Non-HDL-Chol (CHOL-HDL) 04/26/2023 100     TSH 3RD GENERATON 04/26/2023 1.330     Hepatitis C Ab 04/26/2023 Non-reactive     HIV-1 p24 Antigen 04/26/2023 Non-Reactive     HIV-1 Antibody 04/26/2023 Non-Reactive     HIV-2 Antibody 04/26/2023 Non-Reactive     HIV Ag-Ab 5th Gen 04/26/2023 Non-Reactive        Lab Results   Component Value Date    WBC 5.51 04/26/2023    HGB 15.9 04/26/2023    HCT 46.1 04/26/2023    MCV 86 04/26/2023     04/26/2023       Lab Results  "  Component Value Date    SODIUM 135 04/26/2023    K 4.4 04/26/2023     04/26/2023    CO2 22 04/26/2023    AGAP 5 04/26/2023    BUN 17 04/26/2023    CREATININE 0.93 04/26/2023    GLUC 102 05/22/2017    GLUF 70 04/26/2023    CALCIUM 9.2 04/26/2023    AST 17 04/26/2023    ALT 17 04/26/2023    ALKPHOS 66 04/26/2023    TP 7.6 04/26/2023    TBILI 1.37 (H) 04/26/2023    EGFR 118 04/26/2023       Lab Results   Component Value Date    CRP <3.0 05/22/2017       Lab Results   Component Value Date    WKJ6KGQBOJDB 1.330 04/26/2023       No results found for: \"IRON\", \"TIBC\", \"FERRITIN\"    Radiology Results:   No results found.    ______________________________________________________________________  ASSESSMENT AND PLAN:    Demario Messina is a 18 y.o. male who lactose intolerance, GERD, and inconsistent BMs with occasional cramping.  These are long-standing issues and are, overall, at baseline and manageable.  He has not had endoscopic evaluation.  GERD - since he requires frequent famotidine (daily), and has breakthrough symptoms, may benefit from switching to PPI.  Trial of omeprazole 20 mg once daily taken 30 min before breakfast.  Cramping - he asked for renewal of hyoscyamine prescription.  BMs - encouraged him to continue avoiding dairy.  We also discussed the importance of dietary fiber and I provided him with a table and instructions.  Goal: 30 g daily.  We will also check calprotectin to r/o inflammatory issues.  Return in 3 months.    1. Gastroesophageal reflux disease, unspecified whether esophagitis present    2. Irritable bowel syndrome with diarrhea    3. Loose stools    4. Lactose intolerance        Orders Placed This Encounter   Procedures    Calprotectin,Fecal     "

## 2024-02-08 NOTE — PATIENT INSTRUCTIONS
High Fiber Diet   WHAT YOU NEED TO KNOW:   A high-fiber diet includes foods that have a high amount of fiber. Fiber is the part of fruits, vegetables, and grains that is not broken down by your body. Fiber keeps your bowel movements regular. Fiber can also help lower your cholesterol level, control blood sugar in people with diabetes, and relieve constipation. Fiber can also help you control your weight because it helps you feel full faster. Most adults should eat 25 to 35 grams of fiber each day. Talk to your dietitian or healthcare provider about the amount of fiber you need.  DISCHARGE INSTRUCTIONS:   Good sources of fiber:       Foods with at least 4 grams of fiber per serving:      ? to ½ cup of high-fiber cereal (check the nutrition label on the box)    ½ cup of blackberries or raspberries    4 dried prunes    1 cooked artichoke    ½ cup of cooked legumes, such as lentils, or red, kidney, and chandra beans    Foods with 1 to 3 grams of fiber per servin slice of whole-wheat, pumpernickel, or rye bread    ½ cup of cooked brown rice    4 whole-wheat crackers    1 cup of oatmeal    ½ cup of cereal with 1 to 3 grams of fiber per serving (check the nutrition label on the box)    1 small piece of fruit, such as an apple, banana, pear, kiwi, or orange    3 dates    ½ cup of canned apricots, fruit cocktail, peaches, or pears    ½ cup of raw or cooked vegetables, such as carrots, cauliflower, cabbage, spinach, squash, or corn  Ways that you can increase fiber in your diet:   Choose brown or wild rice instead of white rice.     Use whole wheat flour in recipes instead of white or all-purpose flour.     Add beans and peas to casseroles or soups.     Choose fresh fruit and vegetables with peels or skins on instead of juices.    Other diet guidelines to follow:   Add fiber to your diet slowly.  You may have abdominal discomfort, bloating, and gas if you add fiber to your diet too quickly.     Drink plenty of liquids  as you add fiber to your diet.  You may have nausea or develop constipation if you do not drink enough water. Ask how much liquid to drink each day and which liquids are best for you.    © Copyright [employer] 2023 Information is for End User's use only and may not be sold, redistributed or otherwise used for commercial purposes.  The above information is an  only. It is not intended as medical advice for individual conditions or treatments. Talk to your doctor, nurse or pharmacist before following any medical regimen to see if it is safe and effective for you.    Demario Messina  2/8/2024     Recommended Total Fiber Intake**    AGE  MEN  WOMAN    19-50  38 grams/day  25 grams/day    Over 50  30 grams/day  21 grams/day    Fiber Sources in Common Foods   Use this guide to find out if you have enough fiber in you diet.    Food  Size of Serving  Fiber Grams/Servings  Calories/   Serving  Food  Size of Serving  Fiber Grams/Servings  Calories/   Serving    Fruits: (raw unless otherwise noted  Vegetables: (cooked, unless otherwise noted)    Apple (w/peel)  1 medium  3.7  81  Artichoke  1 globe  6.5  60    Apricots  1 cup  3.7  74  Asparagus  ½ cup  1.8  25    Banana  1 medium  2.7  105  Beans:    Blackberries  1 cup  7.2  75  Green (canned)  ½ cup  1.3  14    Blueberries  1 cup  3.9  81  Kidney  ½ cup  5.7  114    Cantaloupe  1 cup  1.3  56  Lima  ½ cup  6.1  85    Grapefruit  1 medium  2.8  82  Scott  ½ cup  7.4  118    Grapes  1 cup  1.6  114  White  ½ cup  5.5  122    Orange  1 medium  3.1  62  Beets  ½ cup  1.6  37    Pear (with peel)  1 medium  4.0  98  Broccoli  ½ cup  2.8  26    Pineapple  1 cup  1.9  76  Cabbage, green  ½ cup  2.1  16    Plums  1 medium  1.0  36  Cabbage, green (raw)  ½ cup  0.8  9    Prunes (dried)  1 cup  11.4  386  Carrots  ½ cup  2.6  35    Raspberries  1 cup  8.4  60  Cauliflower  ½ cup  2.0  17    Strawberries  1 cup  3.4  45  Cauliflower (raw)  ½ cup  1.3  13    Watermelon  1 slice   0.8  51  Celery (raw)  ½ cup  1.0  10    GRAIN PRODUCTS AND OTHERS:  Corn  ½ cup  2.0  66    Bread:  Cucumber (raw)  ½ cup  0.4  7    Hebrew  1 slice  0.8  68  Eggplant  ½ cup  1.2  13    Rye  1 slice  1.6  67  Green Peas  ½ cup  4.4  62    White  1 slice  0.6  67  Lettuce, iceberg (raw)  ½ cup  0.4  4    Whole Wheat  1 slice  2.0  70  Onions (raw)  ½ cup  1.4  30    Cereal:  Potato (baked with skin)  ½ cup  1.5  66    Bran  1 ounce  9.7  70  Spinach  ½ cup  2.7  25    Corn Flakes  1 ounce  1.0  110  Tomato  ½ cup  1.0  19    Oat Bran  1 ounce  4.3  69  Zucchini  ½ cup  1.3  14    Oatmeal  1 ounce  3.0  109  METAMUCIL:    Shredded Wheat  1 ounce  2.8  102  Capsules  6 capsules  3.0  10    Crackers:  Smooth Texture Orange (sugar free)  1 tsp  3.0  20    Kal  1 square  0.1  27  Smooth Texture Orange (with sugar)  1 tbsp  3.0  45    Saltine  1 regular  0.1  13  Wafers  2 wafers  3.0  120    Rice:    Brown  ½ cup  1.8  108    White  ½ cup  0.3  103    Spaghetti  2 ounces  2.1  225    Almonds (roasted)  ½ cup  6.4  351    Peanuts (roasted)  ½ cup  6.1  388      ** [place] [employer], The National Academy of Sciences, 2002   Track your fiber intake for five days. Use the Fiber Source Guide to find out how much fiber is in common food.     If you’re not getting your recommended amount of fiber each day, talk to your doctor about how you can increase the fiber in your diet. Example  Monday Tuesday Wednesday Thursday Friday    Food  Oatmeal    Fiber Grams  2.8    Food  Blueberries    Fiber Grams  3.9    Food  W.W. Bread    Fiber Grams  1.9    Food  W.W. Bread    Fiber Grams  1.9    Food  Apple    Fiber Grams  3.7    Food  Spaghetti    Fiber Grams  .14    Food  Corn    Fiber Grams  2.0    Food  White Bread    Fiber Grams  .6    Food    Fiber Grams    Food    Fiber Grams    Food    Fiber Grams    Food    Fiber Grams    Food    Fiber Grams    Food    Fiber Grams    Food    Fiber Grams    Food    Fiber Grams    Food     Fiber Grams    Food    Fiber Grams    Food    Fiber Grams    Food    Fiber Grams    Add numbers in each column to find your daily fiber intake.    Total Daily Fiber Intake  18.2      Too Low - Like most Americans, this example is not enough fiber. Talk to your doctor about how to add fiber to your diet.   Quick Fiber Facts    Most Americans consume only about half of the recommended fiber they need each day.    Fiber helps maintain normal bowel function, and helps prevent constipation and its potential complications. Straining and pressure from constipation may lead to diverticular disease and hemorrhoids.    Stool softeners or stimulant laxatives only offer short-term relief of constipation, while dietary changes or fiber therapies help break the cycle of irregularity.    Diets low in saturated fat and cholesterol that include 7 grams of soluble fiber per day from psyllium husk, as in Metamucil, may reduce the risk of heart disease by lowering cholesterol. One adult dose of Metamucil has 2.4 grams of this soluable fiber.    Increase fiber intake gradually, giving the body time to adjust.

## 2024-02-12 DIAGNOSIS — Z00.00 WELL ADULT EXAM: Primary | ICD-10-CM

## 2024-02-12 NOTE — TELEPHONE ENCOUNTER
"Called the lab and confirmed that they do draw Blood Type orders at the outpatient lab.  Pt mother stated in the original message that \"she is fine and will pay for them to run Blood Type. They just need an order.\"    Adding on-call provider in Lucille's absence.  "

## 2024-02-12 NOTE — TELEPHONE ENCOUNTER
Can you please call the lab and inquire if this can be drawn at the outpatient lab. Please also have mom verify cost before we order. I am not sure insurance will cover without  a medical need and it may be a pricey test - not sure

## 2024-02-22 ENCOUNTER — OFFICE VISIT (OUTPATIENT)
Dept: UROLOGY | Facility: CLINIC | Age: 19
End: 2024-02-22

## 2024-02-22 VITALS
HEART RATE: 92 BPM | DIASTOLIC BLOOD PRESSURE: 80 MMHG | BODY MASS INDEX: 20.9 KG/M2 | OXYGEN SATURATION: 99 % | WEIGHT: 146 LBS | SYSTOLIC BLOOD PRESSURE: 120 MMHG | HEIGHT: 70 IN

## 2024-02-22 DIAGNOSIS — R30.9 PAIN PASSING URINE: Primary | ICD-10-CM

## 2024-02-22 LAB
BACTERIA UR QL AUTO: ABNORMAL /HPF
BILIRUB UR QL STRIP: NEGATIVE
CLARITY UR: CLEAR
COLOR UR: ABNORMAL
GLUCOSE UR STRIP-MCNC: NEGATIVE MG/DL
HGB UR QL STRIP.AUTO: NEGATIVE
KETONES UR STRIP-MCNC: NEGATIVE MG/DL
LEUKOCYTE ESTERASE UR QL STRIP: NEGATIVE
MUCOUS THREADS UR QL AUTO: ABNORMAL
NITRITE UR QL STRIP: NEGATIVE
NON-SQ EPI CELLS URNS QL MICRO: ABNORMAL /HPF
PH UR STRIP.AUTO: 5.5 [PH]
POST-VOID RESIDUAL VOLUME, ML POC: 12 ML
PROT UR STRIP-MCNC: ABNORMAL MG/DL
RBC #/AREA URNS AUTO: ABNORMAL /HPF
SL AMB  POCT GLUCOSE, UA: NORMAL
SL AMB LEUKOCYTE ESTERASE,UA: NORMAL
SL AMB POCT BILIRUBIN,UA: NORMAL
SL AMB POCT BLOOD,UA: NORMAL
SL AMB POCT CLARITY,UA: CLEAR
SL AMB POCT COLOR,UA: NORMAL
SL AMB POCT KETONES,UA: NORMAL
SL AMB POCT NITRITE,UA: NORMAL
SL AMB POCT PH,UA: 6
SL AMB POCT SPECIFIC GRAVITY,UA: 1030
SL AMB POCT URINE PROTEIN: NORMAL
SL AMB POCT UROBILINOGEN: NORMAL
SP GR UR STRIP.AUTO: 1.03 (ref 1–1.03)
UROBILINOGEN UR STRIP-ACNC: <2 MG/DL
WBC #/AREA URNS AUTO: ABNORMAL /HPF

## 2024-02-22 PROCEDURE — 81001 URINALYSIS AUTO W/SCOPE: CPT

## 2024-02-22 PROCEDURE — 87086 URINE CULTURE/COLONY COUNT: CPT

## 2024-02-22 NOTE — PROGRESS NOTES
"Office Visit- Urology  Demario Messina 2005 MRN: [id number]      Assessment/Discussion/Plan    18 y.o. male managed by     Difficulty with urination/dysuria  -Intermittent episodes of difficulty with urination/suprapubic pain/dysuria  -Happens about once a month but most recent episode was a couple nights ago  -Will obtain urine testing with micro and culture  -PVR today is 12 mL demonstrating complete bladder emptying  -Possibly related to intermittent constipation  -Ultrasound of the kidneys and bladder to evaluate upper urinary tract  -Due to potential concern for stricture disease I do think that it is warranted to proceed with an in office cystoscopy for evaluation of this.  If both ultrasound and cystoscopy are negative would then consider a possible evaluation of pelvic floor dysfunction due to patient's documented history of anxiety  -Follow-up for cystoscopy          Chief Complaint:   Demario is a 18 y.o. male presenting to the office for an initial evaluation regarding difficulty with urination/dysuria        Subjective    Patient is a 18-year-old male with no significant past urologic history presents to the office today for evaluation of intermittent lower urinary tract symptoms including sensation of incomplete bladder emptying, difficulty with urination, dysuria, suprapubic pain that occurs randomly.  He states the episodes happen about once a month where that he feels like at the base of the penis that urine is not able bypass but 1 urine eventually does it burns and is \"hot\".  He denies any gross hematuria or flank pain.  He d does endorse some frequency but notes that he drinks a lot of water.  He has had no trauma to the urethra.  No previous history of urethral stricture.  He denies being sexually active and any previous history of sexually transmitted infections.  No history of urinary tract infection.  He does find the symptoms to be bothersome and like to proceed with workup.  Patient has a " history of depression, anxiety, IBS, lactose intolerance.  He states that he is occasionally constipated    ROS:   Review of Systems   Constitutional: Negative.  Negative for chills, fatigue and fever.   HENT: Negative.     Respiratory:  Negative for shortness of breath.    Cardiovascular:  Negative for chest pain.   Gastrointestinal: Negative.  Negative for abdominal pain.   Endocrine: Negative.    Musculoskeletal: Negative.    Skin: Negative.    Neurological: Negative.  Negative for dizziness and light-headedness.   Hematological: Negative.    Psychiatric/Behavioral: Negative.           Past Medical History  Past Medical History:   Diagnosis Date    Anxiety     Depression     Intermittent diarrhea     Lactose intolerance     Panic attack        Past Surgical History  Past Surgical History:   Procedure Laterality Date    APPENDECTOMY      CIRCUMCISION         Past Family History  Family History   Problem Relation Age of Onset    Stroke Family     No Known Problems Mother     No Known Problems Father     Hypertension Maternal Grandfather     Prostate cancer Maternal Grandfather     Colon cancer Maternal Grandfather     Diabetes Paternal Grandmother        Past Social history  Social History     Socioeconomic History    Marital status: Single     Spouse name: Not on file    Number of children: Not on file    Years of education: Not on file    Highest education level: Not on file   Occupational History    Not on file   Tobacco Use    Smoking status: Never    Smokeless tobacco: Never   Vaping Use    Vaping status: Never Used   Substance and Sexual Activity    Alcohol use: No    Drug use: No    Sexual activity: Never   Other Topics Concern    Not on file   Social History Narrative    Always uses seatbelt    Feels safe at home     Social Determinants of Health     Financial Resource Strain: Not on file   Food Insecurity: No Food Insecurity (2/20/2020)    Hunger Vital Sign     Worried About Running Out of Food in the Last  Year: Never true     Ran Out of Food in the Last Year: Never true   Transportation Needs: No Transportation Needs (2/20/2020)    PRAPARE - Transportation     Lack of Transportation (Medical): No     Lack of Transportation (Non-Medical): No   Physical Activity: Insufficiently Active (2/20/2020)    Exercise Vital Sign     Days of Exercise per Week: 2 days     Minutes of Exercise per Session: 30 min   Stress: Stress Concern Present (2/20/2020)    [employer] - Occupational Stress Questionnaire     Feeling of Stress : To some extent   Social Connections: Unknown (2/20/2020)    Social Connection and Isolation Panel [NHANES]     Frequency of Communication with Friends and Family: Never     Frequency of Social Gatherings with Friends and Family: Never     Attends Mandaeism Services: Never     Active Member of Clubs or Organizations: No     Attends Club or Organization Meetings: Never     Marital Status: Not on file   Intimate Partner Violence: Not At Risk (2/20/2020)    Humiliation, Afraid, Rape, and Kick questionnaire     Fear of Current or Ex-Partner: No     Emotionally Abused: No     Physically Abused: No     Sexually Abused: No   Housing Stability: Not on file       Current Medications  Current Outpatient Medications   Medication Sig Dispense Refill    famotidine (PEPCID) 20 mg tablet Take 1 tablet (20 mg total) by mouth 2 (two) times a day as needed for heartburn 180 tablet 1    hyoscyamine (LEVSIN/SL) 0.125 mg SL tablet Take 2 tablets (0.25 mg total) by mouth every 6 (six) hours as needed for cramping 90 tablet 1    Lactase 4500 units CHEW Chew every 8 (eight) hours      methylphenidate (RITALIN LA) 30 MG 24 hr capsule Take 1 capsule (30 mg total) by mouth every morning Max Daily Amount: 30 mg 30 capsule 0    methylphenidate (Ritalin LA) 30 MG 24 hr capsule Take 1 capsule (30 mg total) by mouth every morning Max Daily Amount: 30 mg Do not start before February 13, 2024. 30 capsule 0     "omeprazole (PriLOSEC) 20 mg delayed release capsule Take 1 capsule (20 mg total) by mouth daily 90 capsule 3    methylphenidate (RITALIN LA) 30 MG 24 hr capsule Take 1 capsule (30 mg total) by mouth every morning Max Daily Amount: 30 mg Do not start before September 1, 2023. (Patient not taking: Reported on 2/8/2024) 30 capsule 0     No current facility-administered medications for this visit.       Allergies  No Known Allergies    OBJECTIVE    Vitals   Vitals:    02/22/24 0929   BP: 120/80   BP Location: Left arm   Patient Position: Sitting   Cuff Size: Standard   Pulse: 92   SpO2: 99%   Weight: 66.2 kg (146 lb)   Height: 5' 10\" (1.778 m)       PVR:    Physical Exam  Constitutional:       General: He is not in acute distress.     Appearance: Normal appearance. He is normal weight. He is not ill-appearing or toxic-appearing.   HENT:      Head: Normocephalic and atraumatic.   Eyes:      Conjunctiva/sclera: Conjunctivae normal.   Cardiovascular:      Rate and Rhythm: Normal rate.   Pulmonary:      Effort: Pulmonary effort is normal. No respiratory distress.   Skin:     General: Skin is warm and dry.   Neurological:      General: No focal deficit present.      Mental Status: He is alert and oriented to person, place, and time.      Cranial Nerves: No cranial nerve deficit.   Psychiatric:         Mood and Affect: Mood normal.         Behavior: Behavior normal.         Thought Content: Thought content normal.          Labs:     Lab Results   Component Value Date    CREATININE 0.93 04/26/2023      No results found for: \"HGBA1C\"  Lab Results   Component Value Date    CALCIUM 9.2 04/26/2023    K 4.4 04/26/2023    CO2 22 04/26/2023     04/26/2023    BUN 17 04/26/2023    CREATININE 0.93 04/26/2023       I have personally reviewed all pertinent lab results and reviewed with patient    Imaging       Byrson Chu PA-C  Date: 2/22/2024 Time: 11:10 AM  [facility]    This note was written using " fluency dictation software. Please excuse any resulting minor grammatical errors.

## 2024-02-23 ENCOUNTER — TELEMEDICINE (OUTPATIENT)
Dept: BEHAVIORAL/MENTAL HEALTH CLINIC | Facility: CLINIC | Age: 19
End: 2024-02-23
Payer: COMMERCIAL

## 2024-02-23 DIAGNOSIS — F32.0 CURRENT MILD EPISODE OF MAJOR DEPRESSIVE DISORDER WITHOUT PRIOR EPISODE (HCC): ICD-10-CM

## 2024-02-23 DIAGNOSIS — F90.0 ADHD (ATTENTION DEFICIT HYPERACTIVITY DISORDER), INATTENTIVE TYPE: ICD-10-CM

## 2024-02-23 DIAGNOSIS — F41.1 GENERALIZED ANXIETY DISORDER: Primary | ICD-10-CM

## 2024-02-23 LAB — BACTERIA UR CULT: NORMAL

## 2024-02-23 PROCEDURE — 90834 PSYTX W PT 45 MINUTES: CPT | Performed by: PSYCHIATRY & NEUROLOGY

## 2024-02-23 NOTE — PSYCH
"Psychotherapy Discharge Summary    Preferred Name: Demario Messina  YOB: 2005    Admission date to psychotherapy: 8/17/2020    Referred by: Dr. Serrato    Presenting Problem: harder to concentrate; stress with school; lost chunks of time-entire periods of classes in school, sometimes would have notes written, other times not, would \"come to\" walking to another class; started happening more this past year, but happened last school year as well, even when little child when he was running things would get \"bright\" and he would feel absent for a few seconds, would stop running and \"shake it off\"; does happen at home as well but not as often as in school-will sit at computer and \"blank out\" but will have written several paragraphs.  He does not feel scared when this happens, but just concerned that he has lost a chunk of time.  His sleep schedule is off now being summer and home due to COVID.  He does admit to some anxiety surrounding school, and that he is a \"germophobe\" so school being virtual in the fall is good news to him.     Course of treatment included : psychoeducation and individual therapy     Progress/Outcome of Treatment Goals (brief summary of course of treatment) Demario made good progress in therapy, easing anxiety about germs and being around other people. He was able to gain control of anxiety utilizing breathing techniques and cognitive reframing, and blanking episodes became less frequent and less upsetting, particularly once placed on ADHD medication.      Treatment Complications (if any): none    Treatment Progress: good    Current [facility] Provider: Dr. Burgos    Discharge Medications include: Ritalin    Discharge Date: 2/23/2024    Discharge Diagnosis:   1. Generalized anxiety disorder        2. Current mild episode of major depressive disorder without prior episode (HCC)        3. ADHD (attention deficit hyperactivity disorder), inattentive type            Criteria for Discharge:  " moving out of state    Aftercare recommendations include (include specific referral names and phone numbers, if appropriate): find new therapist in new state if desired, and return for therapy if needed once he moves back to [place]    Prognosis: good

## 2024-02-23 NOTE — PSYCH
Virtual Regular Visit    Verification of patient location:    Patient is located at Home in the following state in which I hold an active license [place]      Assessment/Plan:    Problem List Items Addressed This Visit          Other    Generalized anxiety disorder - Primary    Current mild episode of major depressive disorder without prior episode (HCC)    ADHD (attention deficit hyperactivity disorder), inattentive type        Reason for visit is   Chief Complaint   Patient presents with    Virtual Regular Visit          Encounter provider REED LÓPEZ    Provider located at [facility]  [facility]  [address] [id number] [phone]      Recent Visits  No visits were found meeting these conditions.  Showing recent visits within past 7 days and meeting all other requirements  Today's Visits  Date Type Provider Dept   02/23/24 Telemedicine REED López  [facility]   Showing today's visits and meeting all other requirements  Future Appointments  No visits were found meeting these conditions.  Showing future appointments within next 150 days and meeting all other requirements       The patient was identified by name and date of birth. Demario Messina was informed that this is a telemedicine visit and that the visit is being conducted throughthe Epic Embedded platform. He agrees to proceed..  My office door was closed. No one else was in the room.  He acknowledged consent and understanding of privacy and security of the video platform. The patient has agreed to participate and understands they can discontinue the visit at any time.    Patient is aware this is a billable service.     HPI     Past Medical History:   Diagnosis Date    Anxiety     Depression     Intermittent diarrhea     Lactose intolerance     Panic attack        Past Surgical History:   Procedure Laterality Date    APPENDECTOMY       CIRCUMCISION         Current Outpatient Medications   Medication Sig Dispense Refill    famotidine (PEPCID) 20 mg tablet Take 1 tablet (20 mg total) by mouth 2 (two) times a day as needed for heartburn 180 tablet 1    hyoscyamine (LEVSIN/SL) 0.125 mg SL tablet Take 2 tablets (0.25 mg total) by mouth every 6 (six) hours as needed for cramping 90 tablet 1    Lactase 4500 units CHEW Chew every 8 (eight) hours      methylphenidate (RITALIN LA) 30 MG 24 hr capsule Take 1 capsule (30 mg total) by mouth every morning Max Daily Amount: 30 mg 30 capsule 0    methylphenidate (RITALIN LA) 30 MG 24 hr capsule Take 1 capsule (30 mg total) by mouth every morning Max Daily Amount: 30 mg Do not start before September 1, 2023. (Patient not taking: Reported on 2/8/2024) 30 capsule 0    methylphenidate (Ritalin LA) 30 MG 24 hr capsule Take 1 capsule (30 mg total) by mouth every morning Max Daily Amount: 30 mg Do not start before February 13, 2024. 30 capsule 0    omeprazole (PriLOSEC) 20 mg delayed release capsule Take 1 capsule (20 mg total) by mouth daily 90 capsule 3     No current facility-administered medications for this visit.        No Known Allergies    Review of Systems    Video Exam    There were no vitals filed for this visit.    Physical Exam     Behavioral Health Psychotherapy Progress Note    Psychotherapy Provided: Individual Psychotherapy     1. Generalized anxiety disorder        2. Current mild episode of major depressive disorder without prior episode (HCC)        3. ADHD (attention deficit hyperactivity disorder), inattentive type            Goals addressed in session: Goal 1 and Goal 2     DATA: Met with Demario for follow up.  Demario shared that he is finalizing preparations to move to [place] for school, having gone down recently with his mother to lease an apartment, buy supplies and furniture and get acclimated to the area.  He said that he is very excited about going, although he thinks it will be hard to say  "goodbye to family and friends.  He has a going away party tomorrow so will get to spend some time with friends and family before leaving.  He shared that he has gotten a bit more anxious as the time approaches, but is managing his anxiety fairly well, noting that he has noticed that he was really only \"blanking\" while he was shopping for four full days with his mom.  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 a lot lately, working on three different projects at a time, but has gotten some really good feedback from people that have read some of it.  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  He also shared that he is excited about living in a new area and having this school program to focus on, as it will lead into his career.  Provided support, validation of Demario's positive progress, and discussed future steps should he find that he needs support while in school, and reminded him that if he moves back to [place], he is welcome back to therapy with this writer if desired.    During this session, this clinician used the following therapeutic modalities: Client-centered Therapy, Cognitive Behavioral Therapy, and Supportive Psychotherapy    Substance Abuse was not addressed during this session. If the client is diagnosed with a co-occurring substance use disorder, please indicate any changes in the frequency or amount of use: n/a. Stage of change for addressing substance use diagnoses: No substance use/Not applicable    ASSESSMENT:  Demario Messina presents with a Euthymic/ normal mood.     his affect is Normal range and intensity, which is congruent, with his mood and the content of the session. The client has made progress on their goals.     Demario Messina presents with a minimal risk of suicide, minimal risk of self-harm, and minimal risk of harm to others.    For any risk assessment that surpasses a \"low\" rating, a safety plan must be developed.    A safety plan was indicated: no  If yes, describe " in detail n/a    PLAN: Between sessions, Demario Messina will be discharged from therapy due to moving out of state for about a year, but will return if he moves back to [place] and desires/needs therapy.     Behavioral Health Treatment Plan and Discharge Planning: Demario Messina is aware of and agrees to continue to work on their treatment plan. They have identified and are working toward their discharge goals. yes    Visit start and stop times:    02/23/24  Start Time: 1502  Stop Time: 1548  Total Visit Time: 46 minutes

## 2024-02-26 ENCOUNTER — OFFICE VISIT (OUTPATIENT)
Dept: CARDIOLOGY CLINIC | Facility: CLINIC | Age: 19
End: 2024-02-26
Payer: COMMERCIAL

## 2024-02-26 VITALS
BODY MASS INDEX: 20.47 KG/M2 | HEIGHT: 70 IN | DIASTOLIC BLOOD PRESSURE: 78 MMHG | HEART RATE: 73 BPM | SYSTOLIC BLOOD PRESSURE: 120 MMHG | WEIGHT: 143 LBS

## 2024-02-26 DIAGNOSIS — R00.2 PALPITATION: Primary | ICD-10-CM

## 2024-02-26 DIAGNOSIS — F90.0 ADHD (ATTENTION DEFICIT HYPERACTIVITY DISORDER), INATTENTIVE TYPE: ICD-10-CM

## 2024-02-26 PROCEDURE — 93000 ELECTROCARDIOGRAM COMPLETE: CPT | Performed by: STUDENT IN AN ORGANIZED HEALTH CARE EDUCATION/TRAINING PROGRAM

## 2024-02-26 PROCEDURE — 99244 OFF/OP CNSLTJ NEW/EST MOD 40: CPT | Performed by: STUDENT IN AN ORGANIZED HEALTH CARE EDUCATION/TRAINING PROGRAM

## 2024-02-26 NOTE — PROGRESS NOTES
Cardiology Consultation     Demario Messina  [id number]  2005  [facility] [place]  [address] [place] [id number]      1. Palpitation  POCT ECG    AMB extended holter monitor      2. ADHD (attention deficit hyperactivity disorder), inattentive type            Discussion/Summary:  Palpitations  -Reports having intermittent episodes of left-sided chest discomfort  - Has been going on for several years and no significant significant exacerbating factors  - Possibly arrhythmia genic versus GI versus musculoskeletal  - Will check a 2-week Zio patch to evaluate for arrhythmias  ADHD  -Currently prescribed Ritalin, however not taking currently, plans to start taking again once he starts college next month  - Last dose of Ritalin was in November 2023  IBS  Anxiety/depression    Patient moving to [place] for school.  Follow-up in 2 months virtually.    History of Present Illness:  Demario Messina is a 18 y.o. year old male with a past medical history of ADHD, IBS and anxiety/depression.  He reports feeling okay today.  He reports having intermittent episodes of left-sided chest discomfort which she describes as a stabbing like sensation intermittently.  After the stabling sensation, he feels the pain pulsing outwards to the middle and right side of his chest.  He reports these pain started a couple years ago, but he had 1 severe episode that occurred at the end of August 2023 that made him fall to the ground.  Generally the pain is constant and worse with each heartbeat and last about 30 minutes, but sometimes longer.  These episodes occur every few days to every few weeks.  They generally improved with deep breaths, lying down and stretching.  No significant exacerbating factors.  They occur with rest and with activity.  Denies any prior cardiac history.  Drinks about 1.5 to 2 L of water daily.  Reports having 1 energy drink a day which is about 200 mg of caffeine.  Patient is starting college in March in  [place], plan to study film.    Patient Active Problem List   Diagnosis    Lactose intolerance    Irritable bowel syndrome with diarrhea    Generalized anxiety disorder    Panic attack    Current mild episode of major depressive disorder without prior episode (HCC)    ADHD (attention deficit hyperactivity disorder), inattentive type     Past Medical History:   Diagnosis Date    Anxiety     Depression     Intermittent diarrhea     Lactose intolerance     Panic attack      Social History     Socioeconomic History    Marital status: Single     Spouse name: Not on file    Number of children: Not on file    Years of education: Not on file    Highest education level: Not on file   Occupational History    Not on file   Tobacco Use    Smoking status: Never    Smokeless tobacco: Never   Vaping Use    Vaping status: Never Used   Substance and Sexual Activity    Alcohol use: No    Drug use: No    Sexual activity: Never   Other Topics Concern    Not on file   Social History Narrative    Always uses seatbelt    Feels safe at home     Social Determinants of Health     Financial Resource Strain: Not on file   Food Insecurity: No Food Insecurity (2/20/2020)    Hunger Vital Sign     Worried About Running Out of Food in the Last Year: Never true     Ran Out of Food in the Last Year: Never true   Transportation Needs: No Transportation Needs (2/20/2020)    PRAPARE - Transportation     Lack of Transportation (Medical): No     Lack of Transportation (Non-Medical): No   Physical Activity: Insufficiently Active (2/20/2020)    Exercise Vital Sign     Days of Exercise per Week: 2 days     Minutes of Exercise per Session: 30 min   Stress: Stress Concern Present (2/20/2020)    [employer] [place] - Occupational Stress Questionnaire     Feeling of Stress : To some extent   Social Connections: Unknown (2/20/2020)    Social Connection and Isolation Panel [NHANES]     Frequency of Communication with Friends and Family: Never      Frequency of Social Gatherings with Friends and Family: Never     Attends Gnosticist Services: Never     Active Member of Clubs or Organizations: No     Attends Club or Organization Meetings: Never     Marital Status: Not on file   Intimate Partner Violence: Not At Risk (2/20/2020)    Humiliation, Afraid, Rape, and Kick questionnaire     Fear of Current or Ex-Partner: No     Emotionally Abused: No     Physically Abused: No     Sexually Abused: No   Housing Stability: Not on file      Family History   Problem Relation Age of Onset    Stroke Family     No Known Problems Mother     No Known Problems Father     Hypertension Maternal Grandfather     Prostate cancer Maternal Grandfather     Colon cancer Maternal Grandfather     Diabetes Paternal Grandmother      Past Surgical History:   Procedure Laterality Date    APPENDECTOMY      CIRCUMCISION         Current Outpatient Medications:     famotidine (PEPCID) 20 mg tablet, Take 1 tablet (20 mg total) by mouth 2 (two) times a day as needed for heartburn, Disp: 180 tablet, Rfl: 1    hyoscyamine (LEVSIN/SL) 0.125 mg SL tablet, Take 2 tablets (0.25 mg total) by mouth every 6 (six) hours as needed for cramping, Disp: 90 tablet, Rfl: 1    Lactase 4500 units CHEW, Chew every 8 (eight) hours, Disp: , Rfl:     methylphenidate (Ritalin LA) 30 MG 24 hr capsule, Take 1 capsule (30 mg total) by mouth every morning Max Daily Amount: 30 mg Do not start before February 13, 2024., Disp: 30 capsule, Rfl: 0    omeprazole (PriLOSEC) 20 mg delayed release capsule, Take 1 capsule (20 mg total) by mouth daily, Disp: 90 capsule, Rfl: 3    methylphenidate (RITALIN LA) 30 MG 24 hr capsule, Take 1 capsule (30 mg total) by mouth every morning Max Daily Amount: 30 mg, Disp: 30 capsule, Rfl: 0    methylphenidate (RITALIN LA) 30 MG 24 hr capsule, Take 1 capsule (30 mg total) by mouth every morning Max Daily Amount: 30 mg Do not start before September 1, 2023., Disp: 30 capsule, Rfl: 0  No Known  "Allergies      Labs:  Lab Results   Component Value Date    ALT 17 2023    AST 17 2023    BUN 17 2023    CALCIUM 9.2 2023     2023    CO2 22 2023    CREATININE 0.93 2023    HDL 49 2023    HCT 46.1 2023    HGB 15.9 2023     2023    K 4.4 2023    TRIG 57 2023    WBC 5.51 2023       Imaging: No results found.    EC2024: Normal sinus rhythm with sinus arrhythmia, otherwise normal ECG    Review of Systems:  Review of Systems   Constitutional:  Negative for chills, diaphoresis, fatigue and fever.   HENT:  Negative for congestion.    Eyes:  Negative for photophobia and visual disturbance.   Respiratory:  Negative for chest tightness and shortness of breath.    Cardiovascular:  Positive for chest pain and palpitations. Negative for leg swelling.   Gastrointestinal:  Negative for abdominal distention, nausea and vomiting.   Genitourinary:  Negative for difficulty urinating and dysuria.   Musculoskeletal:  Negative for arthralgias, gait problem and joint swelling.   Skin:  Negative for color change, pallor and rash.   Neurological:  Negative for dizziness, syncope, numbness and headaches.   Psychiatric/Behavioral:  Negative for agitation, behavioral problems and confusion.          Vitals:    24 1457   BP: 120/78   Pulse: 73      Vitals:    24 1457   Weight: 64.9 kg (143 lb)     Height: 5' 10\" (177.8 cm)     Physical Exam:  General appearance:  Appears stated age, alert, well appearing and in no distress  HEENT:  PERRLA, EOMI, no scleral icterus, no conjunctival pallor  NECK:  Supple, No elevated JVP, no thyromegaly, no carotid bruits  HEART:  Regular rate and rhythm, normal S1/S2, no S3/S4, no murmur or rub  LUNGS:  Clear to auscultation bilaterally  ABDOMEN:  Soft, non-tender, positive bowel sounds, no rebound or guarding, no organomegaly   EXTREMITIES:  No edema  VASCULAR:  Normal pedal pulses   SKIN: No " lesions or rashes on exposed skin  NEURO:  CN II-XII intact, no focal deficits

## 2024-02-26 NOTE — PATIENT INSTRUCTIONS
Heart Palpitations   AMBULATORY CARE:   Heart palpitations  are feelings that your heart races, jumps, throbs, or flutters. You may feel extra beats, no beats for a short time, or skipped beats. You may have these feelings in your chest, throat, or neck. They may happen when you are sitting, standing, or lying. Heart palpitations may be frightening, but are usually not caused by a serious problem.   Call 911 or have someone else call for any of the following:   You have any of the following signs of a heart attack:      Squeezing, pressure, or pain in your chest    You may  also have any of the following:     Discomfort or pain in your back, neck, jaw, stomach, or arm    Shortness of breath    Nausea or vomiting    Lightheadedness or a sudden cold sweat    You have any of the following signs of a stroke:      Numbness or drooping on one side of your face     Weakness in an arm or leg    Confusion or difficulty speaking    Dizziness, a severe headache, or vision loss    You faint or lose consciousness.    Seek care immediately if:   Your palpitations happen more often or last longer than usual.     You have palpitations and shortness of breath, nausea, sweating, or dizziness.    Contact your healthcare provider if:   You have questions or concerns about your condition or care.      Follow up with your healthcare provider as directed:  You may need to follow up with a cardiologist. You may need tests to check for heart problems that cause palpitations. Write down your questions so you remember to ask them during your visits.   Treatment for heart palpitations  is usually not needed. Your healthcare provider may stop or change your medicines if they are causing your palpitations. Conditions that cause palpitations, such as an abnormal heartbeat, will be treated.   Keep a record:  Write down when your palpitations start and stop, what you were doing when they started, and your symptoms. Keep track of what you ate or  drank within a few hours of your palpitations. Include anything that seemed to help your symptoms, such as lying down or holding your breath. This record will help you and your healthcare provider learn what triggers your palpitations. Bring this record with you to your follow up visits.  Help prevent heart palpitations:   Manage stress and anxiety.  Find ways to relax such as listening to music, meditating, or doing yoga. Exercise can also help decrease stress and anxiety. Talk to someone you trust about your stress or anxiety. You can also talk to a therapist.     Get plenty of sleep every night.  Ask your healthcare provider how much sleep you need each night.     Do not drink caffeine or alcohol.  Caffeine and alcohol can make your palpitations worse. Caffeine is found in soda, coffee, tea, chocolate, and drinks that increase your energy.     Do not smoke.  Nicotine and other chemicals in cigarettes and cigars may damage your heart and blood vessels. Ask your healthcare provider for information if you currently smoke and need help to quit. E-cigarettes or smokeless tobacco still contain nicotine. Talk to your healthcare provider before you use these products.     Do not use illegal drugs.  Talk to your healthcare provider if you use illegal drugs and want help to quit.    © Copyright [employer] 2023 Information is for End User's use only and may not be sold, redistributed or otherwise used for commercial purposes.  The above information is an  only. It is not intended as medical advice for individual conditions or treatments. Talk to your doctor, nurse or pharmacist before following any medical regimen to see if it is safe and effective for you.

## 2024-02-27 ENCOUNTER — TELEPHONE (OUTPATIENT)
Dept: PSYCHIATRY | Facility: CLINIC | Age: 19
End: 2024-02-27

## 2024-02-28 ENCOUNTER — APPOINTMENT (OUTPATIENT)
Dept: LAB | Facility: MEDICAL CENTER | Age: 19
End: 2024-02-28
Payer: COMMERCIAL

## 2024-02-28 DIAGNOSIS — Z00.00 WELL ADULT EXAM: ICD-10-CM

## 2024-02-28 DIAGNOSIS — R19.5 LOOSE STOOLS: ICD-10-CM

## 2024-02-28 PROCEDURE — 83993 ASSAY FOR CALPROTECTIN FECAL: CPT

## 2024-02-28 PROCEDURE — 86901 BLOOD TYPING SEROLOGIC RH(D): CPT

## 2024-02-28 PROCEDURE — 36415 COLL VENOUS BLD VENIPUNCTURE: CPT

## 2024-02-28 PROCEDURE — 86900 BLOOD TYPING SEROLOGIC ABO: CPT

## 2024-02-29 LAB
ABO GROUP BLD: NORMAL
RH BLD: POSITIVE

## 2024-03-04 NOTE — TELEPHONE ENCOUNTER
DISCHARGE LETTER for REED López LCSW (certified and regular) placed in outgoing mail on 03/04/24.    Article #:  [id number]    Address:  [address] [place] [id number]

## 2024-03-05 LAB — CALPROTECTIN STL-MCNT: 15 UG/G (ref 0–120)

## 2024-04-05 ENCOUNTER — CLINICAL SUPPORT (OUTPATIENT)
Dept: CARDIOLOGY CLINIC | Facility: CLINIC | Age: 19
End: 2024-04-05
Payer: COMMERCIAL

## 2024-04-05 DIAGNOSIS — R00.2 PALPITATION: ICD-10-CM

## 2024-04-05 PROCEDURE — 93248 EXT ECG>7D<15D REV&INTERPJ: CPT | Performed by: STUDENT IN AN ORGANIZED HEALTH CARE EDUCATION/TRAINING PROGRAM

## 2024-04-25 NOTE — PROGRESS NOTES
Cardiology Consultation     Demario Messina  [id number]  2005  [facility] [place]  [address] [facility] [id number]      No diagnosis found.      Discussion/Summary:  Palpitations  -Reports having intermittent episodes of left-sided chest discomfort  - Has been going on for several years and no significant significant exacerbating factors  - Possibly arrhythmia genic versus GI versus musculoskeletal  - 2 weeks Zio patch 4/5/2024 9 episodes of SVT the longest of which was 9 beats, other symptoms correlate with sinus rhythm/sinus tachycardia with heart rates between 69 to 106 bpm, 1 event also correlated with an isolated PAC    - Will check a 2-week Zio patch to evaluate for arrhythmias  ADHD  -Currently prescribed Ritalin, however not taking currently, plans to start taking again once he starts college next month  - Last dose of Ritalin was in November 2023  IBS  Anxiety/depression    Patient moving to [place] for school.  Follow-up in 2 months virtually.    History of Present Illness:  Demario Messina is a 19 y.o. year old male with a past medical history of ADHD, IBS and anxiety/depression.    2/26/2024: He reports feeling okay today.  He reports having intermittent episodes of left-sided chest discomfort which she describes as a stabbing like sensation intermittently.  After the stabling sensation, he feels the pain pulsing outwards to the middle and right side of his chest.  He reports these pain started a couple years ago, but he had 1 severe episode that occurred at the end of August 2023 that made him fall to the ground.  Generally the pain is constant and worse with each heartbeat and last about 30 minutes, but sometimes longer.  These episodes occur every few days to every few weeks.  They generally improved with deep breaths, lying down and stretching.  No significant exacerbating factors.  They occur with rest and with activity.  Denies any prior cardiac history.  Drinks about 1.5 to 2 L of  water daily.  Reports having 1 energy drink a day which is about 200 mg of caffeine.  Patient is starting college in March in [place], plan to study film.    Interval history:  -    Patient Active Problem List   Diagnosis    Lactose intolerance    Irritable bowel syndrome with diarrhea    Generalized anxiety disorder    Panic attack    Current mild episode of major depressive disorder without prior episode (HCC)    ADHD (attention deficit hyperactivity disorder), inattentive type     Past Medical History:   Diagnosis Date    Anxiety     Depression     Intermittent diarrhea     Lactose intolerance     Panic attack      Social History     Socioeconomic History    Marital status: Single     Spouse name: Not on file    Number of children: Not on file    Years of education: Not on file    Highest education level: Not on file   Occupational History    Not on file   Tobacco Use    Smoking status: Never    Smokeless tobacco: Never   Vaping Use    Vaping status: Never Used   Substance and Sexual Activity    Alcohol use: No    Drug use: No    Sexual activity: Never   Other Topics Concern    Not on file   Social History Narrative    Always uses seatbelt    Feels safe at home     Social Determinants of Health     Financial Resource Strain: Not on file   Food Insecurity: No Food Insecurity (2/20/2020)    Hunger Vital Sign     Worried About Running Out of Food in the Last Year: Never true     Ran Out of Food in the Last Year: Never true   Transportation Needs: No Transportation Needs (2/20/2020)    PRAPARE - Transportation     Lack of Transportation (Medical): No     Lack of Transportation (Non-Medical): No   Physical Activity: Insufficiently Active (2/20/2020)    Exercise Vital Sign     Days of Exercise per Week: 2 days     Minutes of Exercise per Session: 30 min   Stress: Stress Concern Present (2/20/2020)    [place] [employer] - Occupational Stress Questionnaire     Feeling of Stress : To some extent    Social Connections: Unknown (2/20/2020)    Social Connection and Isolation Panel [NHANES]     Frequency of Communication with Friends and Family: Never     Frequency of Social Gatherings with Friends and Family: Never     Attends Baptism Services: Never     Active Member of Clubs or Organizations: No     Attends Club or Organization Meetings: Never     Marital Status: Not on file   Intimate Partner Violence: Not At Risk (2/20/2020)    Humiliation, Afraid, Rape, and Kick questionnaire     Fear of Current or Ex-Partner: No     Emotionally Abused: No     Physically Abused: No     Sexually Abused: No   Housing Stability: Not on file      Family History   Problem Relation Age of Onset    Stroke Family     No Known Problems Mother     No Known Problems Father     Hypertension Maternal Grandfather     Prostate cancer Maternal Grandfather     Colon cancer Maternal Grandfather     Diabetes Paternal Grandmother      Past Surgical History:   Procedure Laterality Date    APPENDECTOMY      CIRCUMCISION         Current Outpatient Medications:     famotidine (PEPCID) 20 mg tablet, Take 1 tablet (20 mg total) by mouth 2 (two) times a day as needed for heartburn, Disp: 180 tablet, Rfl: 1    hyoscyamine (LEVSIN/SL) 0.125 mg SL tablet, Take 2 tablets (0.25 mg total) by mouth every 6 (six) hours as needed for cramping, Disp: 90 tablet, Rfl: 1    Lactase 4500 units CHEW, Chew every 8 (eight) hours, Disp: , Rfl:     methylphenidate (RITALIN LA) 30 MG 24 hr capsule, Take 1 capsule (30 mg total) by mouth every morning Max Daily Amount: 30 mg, Disp: 30 capsule, Rfl: 0    methylphenidate (RITALIN LA) 30 MG 24 hr capsule, Take 1 capsule (30 mg total) by mouth every morning Max Daily Amount: 30 mg Do not start before September 1, 2023., Disp: 30 capsule, Rfl: 0    methylphenidate (Ritalin LA) 30 MG 24 hr capsule, Take 1 capsule (30 mg total) by mouth every morning Max Daily Amount: 30 mg Do not start before February 13, 2024., Disp: 30  capsule, Rfl: 0    omeprazole (PriLOSEC) 20 mg delayed release capsule, Take 1 capsule (20 mg total) by mouth daily, Disp: 90 capsule, Rfl: 3  No Known Allergies      Labs:  Lab Results   Component Value Date    ALT 17 2023    AST 17 2023    BUN 17 2023    CALCIUM 9.2 2023     2023    CO2 22 2023    CREATININE 0.93 2023    HDL 49 2023    HCT 46.1 2023    HGB 15.9 2023     2023    K 4.4 2023    TRIG 57 2023    WBC 5.51 2023       Imaging: No results found.    EC2024: Normal sinus rhythm with sinus arrhythmia, otherwise normal ECG    Review of Systems:  Review of Systems   Constitutional:  Negative for chills, diaphoresis, fatigue and fever.   HENT:  Negative for congestion.    Eyes:  Negative for photophobia and visual disturbance.   Respiratory:  Negative for chest tightness and shortness of breath.    Cardiovascular:  Positive for chest pain and palpitations. Negative for leg swelling.   Gastrointestinal:  Negative for abdominal distention, nausea and vomiting.   Genitourinary:  Negative for difficulty urinating and dysuria.   Musculoskeletal:  Negative for arthralgias, gait problem and joint swelling.   Skin:  Negative for color change, pallor and rash.   Neurological:  Negative for dizziness, syncope, numbness and headaches.   Psychiatric/Behavioral:  Negative for agitation, behavioral problems and confusion.          There were no vitals filed for this visit.     There were no vitals filed for this visit.          Physical Exam:  General appearance:  Appears stated age, alert, well appearing and in no distress  HEENT:  PERRLA, EOMI, no scleral icterus, no conjunctival pallor  NECK:  Supple, No elevated JVP, no thyromegaly, no carotid bruits  HEART:  Regular rate and rhythm, normal S1/S2, no S3/S4, no murmur or rub  LUNGS:  Clear to auscultation bilaterally  ABDOMEN:  Soft, non-tender, positive bowel sounds,  no rebound or guarding, no organomegaly   EXTREMITIES:  No edema  VASCULAR:  Normal pedal pulses   SKIN: No lesions or rashes on exposed skin  NEURO:  CN II-XII intact, no focal deficits

## 2024-04-26 ENCOUNTER — TELEMEDICINE (OUTPATIENT)
Dept: CARDIOLOGY CLINIC | Facility: CLINIC | Age: 19
End: 2024-04-26
Payer: COMMERCIAL

## 2024-04-26 VITALS — WEIGHT: 135.8 LBS | BODY MASS INDEX: 19.44 KG/M2 | HEIGHT: 70 IN

## 2024-04-26 DIAGNOSIS — R00.2 PALPITATION: Primary | ICD-10-CM

## 2024-04-26 DIAGNOSIS — F90.0 ADHD (ATTENTION DEFICIT HYPERACTIVITY DISORDER), INATTENTIVE TYPE: ICD-10-CM

## 2024-04-26 PROCEDURE — 99214 OFFICE O/P EST MOD 30 MIN: CPT | Performed by: STUDENT IN AN ORGANIZED HEALTH CARE EDUCATION/TRAINING PROGRAM

## 2024-04-26 NOTE — PROGRESS NOTES
Virtual Regular Visit    Verification of patient location:    Patient is located at Home in the following state in which I hold an active license [place]      Assessment/Plan:    Problem List Items Addressed This Visit       ADHD (attention deficit hyperactivity disorder), inattentive type     Other Visit Diagnoses       Palpitation    -  Primary    Relevant Medications    metoprolol tartrate (LOPRESSOR) 25 mg tablet          Discussion/Summary:  Palpitations  -Reports having intermittent episodes of left-sided chest discomfort  - Has been going on for several years and no significant significant exacerbating factors  - Possibly arrhythmia genic versus GI versus musculoskeletal  - 2 weeks Zio patch 4/5/2024 9 episodes of SVT the longest of which was 9 beats, other symptoms correlate with sinus rhythm/sinus tachycardia with heart rates between 69 to 106 bpm, 1 event also correlated with an isolated PAC  - No significant arrhythmias appear to be causing his palpitations, but will try as needed Lopressor 12.5 mg up to twice a day to see if it improves his symptoms  ADHD  -Currently prescribed Ritalin, however not taking currently, plans to start taking again once he starts college next month  - Last dose of Ritalin was in November 2023  IBS  Anxiety/depression    Follow-up 3 months virtually or in person if possible.    History of Present Illness:  Demario Messina is a 19 y.o. year old male with a past medical history of ADHD, IBS and anxiety/depression.    2/26/2024: He reports feeling okay today.  He reports having intermittent episodes of left-sided chest discomfort which he describes as a stabbing like sensation intermittently.  After the stabling sensation, he feels the pain pulsing outwards to the middle and right side of his chest.  He reports these pain started a couple years ago, but he had 1 severe episode that occurred at the end of August 2023 that made him fall to the ground.  Generally the pain is constant and worse  with each heartbeat and last about 30 minutes, but sometimes longer.  These episodes occur every few days to every few weeks.  They generally improved with deep breaths, lying down and stretching.  No significant exacerbating factors.  They occur with rest and with activity.  Denies any prior cardiac history.  Drinks about 1.5 to 2 L of water daily.  Reports having 1 energy drink a day which is about 200 mg of caffeine.  Patient is starting college in March in [place], plan to study film.    Interval history:  He reports feeling okay today.  No significant change in his symptoms since last visit.  He continues to have episodes of chest discomfort.  Denies any shortness of breath, lower extremity edema, or other concerning cardiac symptoms at this time.       Reason for visit is   Chief Complaint   Patient presents with    Virtual Visit      Return Visit     Virtual Regular Visit          Encounter provider Alli Joseph MD      Recent Visits  No visits were found meeting these conditions.  Showing recent visits within past 7 days and meeting all other requirements  Today's Visits  Date Type Provider Dept   04/26/24 Telemedicine Alli Joseph MD [facility]   Showing today's visits and meeting all other requirements  Future Appointments  No visits were found meeting these conditions.  Showing future appointments within next 150 days and meeting all other requirements       The patient was identified by name and date of birth. Demario Messina was informed that this is a telemedicine visit and that the visit is being conducted through the Epic Embedded platform. He agrees to proceed..  My office door was closed. No one else was in the room.  He acknowledged consent and understanding of privacy and security of the video platform. The patient has agreed to participate and understands they can discontinue the visit at any time.    Patient is aware this is a billable service.         Past  Medical History:   Diagnosis Date    Anxiety     Depression     Intermittent diarrhea     Lactose intolerance     Panic attack        Past Surgical History:   Procedure Laterality Date    APPENDECTOMY      CIRCUMCISION         Current Outpatient Medications   Medication Sig Dispense Refill    famotidine (PEPCID) 20 mg tablet Take 1 tablet (20 mg total) by mouth 2 (two) times a day as needed for heartburn 180 tablet 1    hyoscyamine (LEVSIN/SL) 0.125 mg SL tablet Take 2 tablets (0.25 mg total) by mouth every 6 (six) hours as needed for cramping 90 tablet 1    Lactase 4500 units CHEW Chew every 8 (eight) hours      methylphenidate (Ritalin LA) 30 MG 24 hr capsule Take 1 capsule (30 mg total) by mouth every morning Max Daily Amount: 30 mg Do not start before February 13, 2024. 30 capsule 0    metoprolol tartrate (LOPRESSOR) 25 mg tablet Take 0.5 tablets (12.5 mg total) by mouth 2 (two) times a day as needed (Palpitations) 30 tablet 1    omeprazole (PriLOSEC) 20 mg delayed release capsule Take 1 capsule (20 mg total) by mouth daily 90 capsule 3     No current facility-administered medications for this visit.        No Known Allergies    Review of Systems   Constitutional:  Negative for chills, diaphoresis, fatigue and fever.   HENT:  Negative for congestion.    Eyes:  Negative for photophobia and visual disturbance.   Respiratory:  Negative for shortness of breath.    Cardiovascular:  Positive for chest pain and palpitations. Negative for leg swelling.   Gastrointestinal:  Negative for abdominal distention, abdominal pain, diarrhea, nausea and vomiting.   Genitourinary:  Negative for difficulty urinating and dysuria.   Musculoskeletal:  Negative for arthralgias, gait problem and joint swelling.   Skin:  Negative for color change, pallor and rash.   Neurological:  Negative for dizziness, syncope, numbness and headaches.   Psychiatric/Behavioral:  Negative for agitation, behavioral problems and confusion.        Video  "Exam    Vitals:    04/26/24 1442   Weight: 61.6 kg (135 lb 12.8 oz)   Height: 5' 10\" (1.778 m)       Physical Exam  Constitutional:       General: He is not in acute distress.     Appearance: Normal appearance. He is obese.   HENT:      Head: Normocephalic and atraumatic.   Eyes:      Conjunctiva/sclera: Conjunctivae normal.   Pulmonary:      Effort: Pulmonary effort is normal.   Skin:     Coloration: Skin is not jaundiced or pale.   Neurological:      Mental Status: He is alert and oriented to person, place, and time. Mental status is at baseline.   Psychiatric:         Mood and Affect: Mood normal.         Behavior: Behavior normal.         Thought Content: Thought content normal.          Visit Time  Total Visit Duration: 20 minutes.        "

## 2024-12-20 ENCOUNTER — TELEPHONE (OUTPATIENT)
Dept: FAMILY MEDICINE CLINIC | Facility: CLINIC | Age: 19
End: 2024-12-20

## 2025-05-28 ENCOUNTER — TELEPHONE (OUTPATIENT)
Dept: FAMILY MEDICINE CLINIC | Facility: CLINIC | Age: 20
End: 2025-05-28